# Patient Record
Sex: MALE | URBAN - METROPOLITAN AREA
[De-identification: names, ages, dates, MRNs, and addresses within clinical notes are randomized per-mention and may not be internally consistent; named-entity substitution may affect disease eponyms.]

---

## 2023-11-22 ENCOUNTER — DOCTOR'S OFFICE (OUTPATIENT)
Dept: URBAN - METROPOLITAN AREA CLINIC 162 | Facility: CLINIC | Age: 33
Setting detail: OPHTHALMOLOGY
End: 2023-11-22
Payer: COMMERCIAL

## 2023-11-22 DIAGNOSIS — H57.12: ICD-10-CM

## 2023-11-22 PROCEDURE — 92004 COMPRE OPH EXAM NEW PT 1/>: CPT | Performed by: OPHTHALMOLOGY

## 2023-11-22 ASSESSMENT — CONFRONTATIONAL VISUAL FIELD TEST (CVF)
OS_FINDINGS: FULL
OD_FINDINGS: FULL

## 2024-02-23 ENCOUNTER — HOSPITAL ENCOUNTER (EMERGENCY)
Facility: HOSPITAL | Age: 34
Discharge: HOME | End: 2024-02-23
Attending: EMERGENCY MEDICINE
Payer: COMMERCIAL

## 2024-02-23 VITALS
SYSTOLIC BLOOD PRESSURE: 166 MMHG | WEIGHT: 230 LBS | BODY MASS INDEX: 29.52 KG/M2 | DIASTOLIC BLOOD PRESSURE: 74 MMHG | OXYGEN SATURATION: 100 % | TEMPERATURE: 98.7 F | HEART RATE: 72 BPM | HEIGHT: 74 IN | RESPIRATION RATE: 17 BRPM

## 2024-02-23 DIAGNOSIS — R59.0 LYMPHADENOPATHY, AXILLARY: Primary | ICD-10-CM

## 2024-02-23 LAB
ANION GAP SERPL CALCULATED.3IONS-SCNC: 5 MMOL/L
BASOPHILS # BLD AUTO: 0.02 THOUSANDS/ÂΜL (ref 0–0.1)
BASOPHILS NFR BLD AUTO: 0 % (ref 0–1)
BILIRUB UR QL STRIP: NEGATIVE
BUN SERPL-MCNC: 22 MG/DL (ref 5–25)
CALCIUM SERPL-MCNC: 10 MG/DL (ref 8.4–10.2)
CHLORIDE SERPL-SCNC: 104 MMOL/L (ref 96–108)
CLARITY UR: CLEAR
CO2 SERPL-SCNC: 30 MMOL/L (ref 21–32)
COLOR UR: NORMAL
CREAT SERPL-MCNC: 1.19 MG/DL (ref 0.6–1.3)
EOSINOPHIL # BLD AUTO: 0.05 THOUSAND/ÂΜL (ref 0–0.61)
EOSINOPHIL NFR BLD AUTO: 1 % (ref 0–6)
ERYTHROCYTE [DISTWIDTH] IN BLOOD BY AUTOMATED COUNT: 13 % (ref 11.6–15.1)
GFR SERPL CREATININE-BSD FRML MDRD: 78 ML/MIN/1.73SQ M
GLUCOSE SERPL-MCNC: 90 MG/DL (ref 65–140)
GLUCOSE UR STRIP-MCNC: NEGATIVE MG/DL
HCT VFR BLD AUTO: 43.5 % (ref 36.5–49.3)
HGB BLD-MCNC: 14 G/DL (ref 12–17)
HGB UR QL STRIP.AUTO: NEGATIVE
IMM GRANULOCYTES # BLD AUTO: 0.06 THOUSAND/UL (ref 0–0.2)
IMM GRANULOCYTES NFR BLD AUTO: 1 % (ref 0–2)
KETONES UR STRIP-MCNC: NEGATIVE MG/DL
LEUKOCYTE ESTERASE UR QL STRIP: NEGATIVE
LYMPHOCYTES # BLD AUTO: 2.35 THOUSANDS/ÂΜL (ref 0.6–4.47)
LYMPHOCYTES NFR BLD AUTO: 43 % (ref 14–44)
MCH RBC QN AUTO: 28.2 PG (ref 26.8–34.3)
MCHC RBC AUTO-ENTMCNC: 32.2 G/DL (ref 31.4–37.4)
MCV RBC AUTO: 88 FL (ref 82–98)
MONOCYTES # BLD AUTO: 0.34 THOUSAND/ÂΜL (ref 0.17–1.22)
MONOCYTES NFR BLD AUTO: 6 % (ref 4–12)
NEUTROPHILS # BLD AUTO: 2.63 THOUSANDS/ÂΜL (ref 1.85–7.62)
NEUTS SEG NFR BLD AUTO: 49 % (ref 43–75)
NITRITE UR QL STRIP: NEGATIVE
NRBC BLD AUTO-RTO: 0 /100 WBCS
PH UR STRIP.AUTO: 6 [PH]
PLATELET # BLD AUTO: 307 THOUSANDS/UL (ref 149–390)
PMV BLD AUTO: 8.5 FL (ref 8.9–12.7)
POTASSIUM SERPL-SCNC: 4.8 MMOL/L (ref 3.5–5.3)
PROT UR STRIP-MCNC: NEGATIVE MG/DL
RBC # BLD AUTO: 4.97 MILLION/UL (ref 3.88–5.62)
SODIUM SERPL-SCNC: 139 MMOL/L (ref 135–147)
SP GR UR STRIP.AUTO: 1.03 (ref 1–1.03)
UROBILINOGEN UR STRIP-ACNC: <2 MG/DL
WBC # BLD AUTO: 5.45 THOUSAND/UL (ref 4.31–10.16)

## 2024-02-23 PROCEDURE — 81003 URINALYSIS AUTO W/O SCOPE: CPT

## 2024-02-23 PROCEDURE — 36415 COLL VENOUS BLD VENIPUNCTURE: CPT

## 2024-02-23 PROCEDURE — 99284 EMERGENCY DEPT VISIT MOD MDM: CPT

## 2024-02-23 PROCEDURE — 85025 COMPLETE CBC W/AUTO DIFF WBC: CPT

## 2024-02-23 PROCEDURE — 80048 BASIC METABOLIC PNL TOTAL CA: CPT

## 2024-02-23 PROCEDURE — 99283 EMERGENCY DEPT VISIT LOW MDM: CPT

## 2024-02-23 NOTE — Clinical Note
Tai Chance was seen and treated in our emergency department on 2/23/2024.                Diagnosis: Lymphadenopathy    Tai  may return to work on return date.    He may return on this date: 02/26/2024         If you have any questions or concerns, please don't hesitate to call.      Remington Velazquez PA-C    ______________________________           _______________          _______________  Hospital Representative                              Date                                Time

## 2024-02-23 NOTE — ED PROVIDER NOTES
"History  Chief Complaint   Patient presents with    Swollen Glands     Pt reports being seen at urgent care 3 weeks ago, was told he had swollen lymph nodes under arm and they should subside. Reports they are still there and are now \"bothersome\" Does not have a PCP currently.      Patient is a 33-year-old male presented ED with chief complaint of lymphadenopathy in the axillary region.  Patient states that he was seen at urgent care 2 weeks ago for same issue and they stated that if it does not go away in 2 weeks to have a follow-up appointment.  Patient presents today because he does not have a PCP and the swelling to the has not gone away.  Patient does state that the swelling has decreased since the initial presentation at the urgent care.  Denies any pain but he states that there is some discomfort in his axillary region.  He also states that he has some discomfort in his left side of his body.  Patient states that he has some tenderness over his ribs on the left side.  Patient was found to be flu positive when the lymphadenopathy first arose.  Denies any masses over chest neck.  Denies cough, congestion, vomiting, diarrhea, chest pain, shortness of breath, loss function, loss sensation, ambulation issues, gait dysfunction, fevers, weight loss, diaphoresis, easy bruising, other adenopathy, urinary bowel changes, hemoptysis, meta emesis, hematochezia.        None       History reviewed. No pertinent past medical history.    History reviewed. No pertinent surgical history.    History reviewed. No pertinent family history.  I have reviewed and agree with the history as documented.    E-Cigarette/Vaping     E-Cigarette/Vaping Substances     Social History     Tobacco Use    Smoking status: Never    Smokeless tobacco: Never   Substance Use Topics    Alcohol use: Not Currently    Drug use: Never       Review of Systems   Constitutional:  Negative for chills, diaphoresis, fatigue and fever.   HENT:  Negative for " congestion, ear pain, postnasal drip, rhinorrhea, sinus pressure, sinus pain and sore throat.    Eyes:  Negative for pain and discharge.   Respiratory:  Negative for cough and shortness of breath.    Cardiovascular:  Negative for chest pain and palpitations.   Gastrointestinal:  Negative for abdominal pain, blood in stool, constipation, diarrhea, nausea and vomiting.   Genitourinary:  Negative for difficulty urinating, dysuria, flank pain, hematuria, penile pain and testicular pain.   Musculoskeletal:  Negative for arthralgias, back pain, myalgias and neck pain.   Skin:  Negative for color change, rash and wound.   Neurological:  Negative for dizziness, syncope, light-headedness, numbness and headaches.   Hematological:  Positive for adenopathy.       Physical Exam  Physical Exam  Vitals and nursing note reviewed.   Constitutional:       General: He is not in acute distress.     Appearance: Normal appearance. He is normal weight. He is not ill-appearing, toxic-appearing or diaphoretic.   HENT:      Head: Normocephalic.      Right Ear: External ear normal.      Left Ear: External ear normal.      Nose: Nose normal.      Mouth/Throat:      Mouth: Mucous membranes are moist.   Eyes:      Conjunctiva/sclera: Conjunctivae normal.   Cardiovascular:      Rate and Rhythm: Normal rate and regular rhythm.      Pulses: Normal pulses.      Heart sounds: Normal heart sounds.   Pulmonary:      Effort: Pulmonary effort is normal. No respiratory distress.      Breath sounds: Normal breath sounds. No stridor. No wheezing, rhonchi or rales.   Chest:      Chest wall: No tenderness.   Abdominal:      General: Bowel sounds are normal. There is no distension.      Palpations: Abdomen is soft. There is no mass.      Tenderness: There is no abdominal tenderness. There is no right CVA tenderness, left CVA tenderness or guarding.      Hernia: No hernia is present.   Musculoskeletal:         General: No tenderness. Normal range of motion.       Cervical back: Normal range of motion. No rigidity or tenderness.   Lymphadenopathy:      Cervical: No cervical adenopathy.   Skin:     General: Skin is warm and dry.      Capillary Refill: Capillary refill takes less than 2 seconds.      Findings: No rash.   Neurological:      General: No focal deficit present.      Mental Status: He is alert and oriented to person, place, and time.      Comments: 1 cm area of adenopathy in axillary region.  Firm to palpation nontender.  The 1 cm lesion is movable.   Psychiatric:         Mood and Affect: Mood normal.         Vital Signs  ED Triage Vitals [02/23/24 0814]   Temperature Pulse Respirations Blood Pressure SpO2   98.7 °F (37.1 °C) 72 17 166/74 100 %      Temp Source Heart Rate Source Patient Position - Orthostatic VS BP Location FiO2 (%)   Tympanic Monitor Sitting Right arm --      Pain Score       --           Vitals:    02/23/24 0814   BP: 166/74   Pulse: 72   Patient Position - Orthostatic VS: Sitting         Visual Acuity      ED Medications  Medications - No data to display    Diagnostic Studies  Results Reviewed       Procedure Component Value Units Date/Time    Basic metabolic panel [[id number]] Collected: 02/23/24 0910    Lab Status: Final result Specimen: Blood from Arm, Right Updated: 02/23/24 0953     Sodium 139 mmol/L      Potassium 4.8 mmol/L      Chloride 104 mmol/L      CO2 30 mmol/L      ANION GAP 5 mmol/L      BUN 22 mg/dL      Creatinine 1.19 mg/dL      Glucose 90 mg/dL      Calcium 10.0 mg/dL      eGFR 78 ml/min/1.73sq m     Narrative:      National Kid[employer] Foundation guidelines for Chronic Kidney Disease (CKD):     Stage 1 with normal or high GFR (GFR > 90 mL/min/1.73 square meters)    Stage 2 Mild CKD (GFR = 60-89 mL/min/1.73 square meters)    Stage 3A Moderate CKD (GFR = 45-59 mL/min/1.73 square meters)    Stage 3B Moderate CKD (GFR = 30-44 mL/min/1.73 square meters)    Stage 4 Severe CKD (GFR = 15-29 mL/min/1.73 square meters)    Stage 5 End  Stage CKD (GFR <15 mL/min/1.73 square meters)  Note: GFR calculation is accurate only with a steady state creatinine    UA w Reflex to Microscopic w Reflex to Culture [[id number]] Collected: 02/23/24 0909    Lab Status: Final result Specimen: Urine, Clean Catch Updated: 02/23/24 0920     Color, UA Light Yellow     Clarity, UA Clear     Specific Gravity, UA 1.028     pH, UA 6.0     Leukocytes, UA Negative     Nitrite, UA Negative     Protein, UA Negative mg/dl      Glucose, UA Negative mg/dl      Ketones, UA Negative mg/dl      Urobilinogen, UA <2.0 mg/dl      Bilirubin, UA Negative     Occult Blood, UA Negative    CBC and differential [[id number]]  (Abnormal) Collected: 02/23/24 0910    Lab Status: Final result Specimen: Blood from Arm, Right Updated: 02/23/24 0918     WBC 5.45 Thousand/uL      RBC 4.97 Million/uL      Hemoglobin 14.0 g/dL      Hematocrit 43.5 %      MCV 88 fL      MCH 28.2 pg      MCHC 32.2 g/dL      RDW 13.0 %      MPV 8.5 fL      Platelets 307 Thousands/uL      nRBC 0 /100 WBCs      Neutrophils Relative 49 %      Immat GRANS % 1 %      Lymphocytes Relative 43 %      Monocytes Relative 6 %      Eosinophils Relative 1 %      Basophils Relative 0 %      Neutrophils Absolute 2.63 Thousands/µL      Immature Grans Absolute 0.06 Thousand/uL      Lymphocytes Absolute 2.35 Thousands/µL      Monocytes Absolute 0.34 Thousand/µL      Eosinophils Absolute 0.05 Thousand/µL      Basophils Absolute 0.02 Thousands/µL                    No orders to display              Procedures  POC MSK/Soft Tissue US    Date/Time: 2/23/2024 10:24 AM    Performed by: Remington Velazquez PA-C  Authorized by: Remington Velazquez PA-C    Patient location:  ED  Performed by:  NP/PA  Other Assisting Provider: No    Procedure:     Performed: soft tissue ultrasound    Procedure details:     Exam Type:  Diagnostic    Transverse view:  Obtained    Image quality: limited diagnostic      Image availability:  Still images obtained  Soft tissue  ultrasound:     Soft tissue indications: swelling and suspected abscess      Anatomic location:  Axilla    Soft tissue findings: subcutaneous collection (comment)    Interpretation:     Soft tissue impressions: indeterminate             ED Course                                             Medical Decision Making  Patient is 33 male presenting ED with chief complaint of lymphadenopathy in the axilla region.  Patient stated that he was seen in urgent care 2 weeks ago who stated that if it is not better in 2 weeks to be seen again.  Exam shows benign cardiopulmonary exam.  Patient also has benign abdominal exam.  Patient stated that he had a little bit of flank tenderness.  Urinalysis shows no abnormalities.  1 cm firm abscess in the axilla region lysed under ultrasound with picture in media.  Lesion is nontender to palpation.  Patient also states that the lesion is shrinking over the past 2 weeks.  I explained that he should continue to monitor for the next 4 weeks and then follow-up with PCP if it does not resolve or gets bigger or becomes painful.  Patient was for diagnosis treatment plan had no further questions.  Patient was discharged in stable condition with no further questions.  Baseline labs showed normal abnormalities.  Patient told to return to the ED with any worsening symptoms, chest pain, shortness of breath, loss of sensation, loss of function, fatigue, weakness, weight loss, decreased oral intake.    Ddx-axilla lymphadenopathy, sebaceous cyst, cyst, folliculitis, viral illness, viral syndrome    Amount and/or Complexity of Data Reviewed  Labs: ordered.  Radiology: ordered.     Details: Ultrasound in media.             Disposition  Final diagnoses:   Lymphadenopathy, axillary     Time reflects when diagnosis was documented in both MDM as applicable and the Disposition within this note       Time User Action Codes Description Comment    2/23/2024  9:58 AM Remington Velazquez Add [R59.0] Lymphadenopathy,  axillary           ED Disposition       ED Disposition   Discharge    Condition   Stable    Date/Time   Fri Feb 23, 2024  9:57 AM    Comment   Tai Chance discharge to home/self care.                   Follow-up Information       Follow up With Specialties Details Why Contact Info Additional Information    [facility] Emergency Medicine  If symptoms worsen [address] [facility] [address]  [phone] [facility], [address] [place], [place], [address]    [facility] In 1 week  [address]  [facility] [id number] [phone] [facility], [address], [place], [place], [address] [phone]            There are no discharge medications for this patient.          PDMP Review       None            ED Provider  Electronically Signed by             Remington Velazquez PA-C  02/23/24 3809

## 2024-02-23 NOTE — DISCHARGE INSTRUCTIONS
Patient advised to follow-up with PCP for today's ED visit.  Patient advised to continue to monitor lymphadenopathy over the next 3 weeks.  Patient advised to return to the ED with any worsening symptoms, chest pain, shortness of breath, fevers, weakness, excessive weight loss, loss of function, loss sensation.

## 2024-05-09 ENCOUNTER — OFFICE VISIT (OUTPATIENT)
Dept: URGENT CARE | Facility: CLINIC | Age: 34
End: 2024-05-09
Payer: COMMERCIAL

## 2024-05-09 VITALS
TEMPERATURE: 98.6 F | HEART RATE: 108 BPM | DIASTOLIC BLOOD PRESSURE: 77 MMHG | SYSTOLIC BLOOD PRESSURE: 148 MMHG | OXYGEN SATURATION: 97 % | RESPIRATION RATE: 18 BRPM

## 2024-05-09 DIAGNOSIS — J02.9 ACUTE PHARYNGITIS, UNSPECIFIED ETIOLOGY: Primary | ICD-10-CM

## 2024-05-09 LAB — S PYO AG THROAT QL: NEGATIVE

## 2024-05-09 PROCEDURE — 87147 CULTURE TYPE IMMUNOLOGIC: CPT | Performed by: PHYSICIAN ASSISTANT

## 2024-05-09 PROCEDURE — G0382 LEV 3 HOSP TYPE B ED VISIT: HCPCS | Performed by: PHYSICIAN ASSISTANT

## 2024-05-09 PROCEDURE — S9083 URGENT CARE CENTER GLOBAL: HCPCS | Performed by: PHYSICIAN ASSISTANT

## 2024-05-09 PROCEDURE — 87070 CULTURE OTHR SPECIMN AEROBIC: CPT | Performed by: PHYSICIAN ASSISTANT

## 2024-05-09 NOTE — PROGRESS NOTES
[facility]        NAME: Tai Chance is a 33 y.o. male  : 1990    MRN: [id number]  DATE: May 9, 2024  TIME: 8:28 AM    Assessment and Plan   Acute pharyngitis, unspecified etiology [J02.9]  1. Acute pharyngitis, unspecified etiology  POCT rapid ANTIGEN strepA    Throat culture        Likely viral.  Educated pt on symptomatic management.  Will send for culture.  Pt understands and agrees to plan    Patient Instructions       Follow up with PCP in 3-5 days.  Proceed to  ER if symptoms worsen.    If tests are performed, our office will contact you with results only if changes need to made to the care plan discussed with you at the visit. [facility]    Chief Complaint     Chief Complaint   Patient presents with    Sore Throat     Sore throat, left ear pain, cough, congestion since last night. Had fever yesterday.         History of Present Illness       Sore Throat   This is a new problem. The current episode started yesterday. The problem has been gradually worsening. Neither side of throat is experiencing more pain than the other. There has been no fever. The pain is moderate. Associated symptoms include coughing, a hoarse voice and swollen glands. Pertinent negatives include no abdominal pain, congestion, diarrhea, ear discharge, ear pain, headaches, shortness of breath, stridor, trouble swallowing or vomiting. He has had no exposure to strep or mono. He has tried nothing for the symptoms. The treatment provided no relief.       Review of Systems   Review of Systems   Constitutional:  Negative for chills, diaphoresis, fatigue and fever.   HENT:  Positive for hoarse voice, postnasal drip, sinus pressure, sinus pain and sore throat. Negative for congestion, ear discharge, ear pain, rhinorrhea, sneezing and trouble swallowing.    Eyes: Negative.    Respiratory:  Positive for cough. Negative for chest tightness, shortness of breath, wheezing and stridor.     Cardiovascular: Negative.  Negative for chest pain and palpitations.   Gastrointestinal:  Negative for abdominal distention, abdominal pain, diarrhea, nausea and vomiting.   Endocrine: Negative.    Genitourinary:  Negative for dysuria.   Musculoskeletal: Negative.    Skin:  Negative for rash.   Allergic/Immunologic: Negative.    Neurological: Negative.  Negative for light-headedness and headaches.   Hematological: Negative.    Psychiatric/Behavioral: Negative.           Current Medications     No current outpatient medications on file.    Current Allergies     Allergies as of 05/09/2024    (No Known Allergies)            The following portions of the patient's history were reviewed and updated as appropriate: allergies, current medications, past family history, past medical history, past social history, past surgical history and problem list.     History reviewed. No pertinent past medical history.    History reviewed. No pertinent surgical history.    History reviewed. No pertinent family history.      Medications have been verified.        Objective   /77   Pulse (!) 108   Temp 98.6 °F (37 °C)   Resp 18   SpO2 97%        Physical Exam     Physical Exam  Vitals and nursing note reviewed.   Constitutional:       General: He is not in acute distress.     Appearance: Normal appearance. He is well-developed. He is not ill-appearing or diaphoretic.   HENT:      Head: Normocephalic and atraumatic.      Right Ear: Tympanic membrane, ear canal and external ear normal.      Left Ear: Tympanic membrane, ear canal and external ear normal.      Nose: Congestion present. No rhinorrhea.      Mouth/Throat:      Pharynx: Posterior oropharyngeal erythema present. No oropharyngeal exudate.   Eyes:      General:         Right eye: No discharge.         Left eye: No discharge.      Conjunctiva/sclera: Conjunctivae normal.      Pupils: Pupils are equal, round, and reactive to light.   Cardiovascular:      Rate and Rhythm:  Normal rate and regular rhythm.      Heart sounds: Normal heart sounds.   Pulmonary:      Effort: Pulmonary effort is normal. No respiratory distress.      Breath sounds: Normal breath sounds. No wheezing, rhonchi or rales.   Musculoskeletal:      Cervical back: Normal range of motion and neck supple.   Lymphadenopathy:      Cervical: No cervical adenopathy.   Skin:     General: Skin is warm.      Capillary Refill: Capillary refill takes less than 2 seconds.      Coloration: Skin is not pale.      Findings: No rash.   Neurological:      Mental Status: He is alert.

## 2024-05-09 NOTE — LETTER
May 9, 2024     Patient: Tai Chance   YOB: 1990   Date of Visit: 5/9/2024       To Whom It May Concern:    It is my medical opinion that Tai Chance may return to work on 5/11/2024 .    If you have any questions or concerns, please don't hesitate to call.         Sincerely,        Phuc Sinha PA-C    CC: No Recipients

## 2024-05-09 NOTE — PATIENT INSTRUCTIONS
Continue to monitor symptoms.  Drink plenty of fluids.  Use over the counter Tylenol or Ibuprofen for fever and pain relief.  If new or worsening symptoms develop, go immediately to the Er.  Follow up with family doctor this week.    If tests are performed, our office will contact you with results only if changes need to made to the care plan discussed with you at the visit. You can review your full results on [facility] Mychart.     Pharyngitis   WHAT YOU NEED TO KNOW:   Pharyngitis, or sore throat, is inflammation of the tissues and structures in your pharynx (throat). Pharyngitis is most often caused by bacteria or a virus. Other causes include smoking, allergies, or acid reflux.  DISCHARGE INSTRUCTIONS:   Call your local emergency number (911 in the [place]) if:   You have trouble breathing or swallowing because your throat is swollen.      Return to the emergency department if:   You are drooling because it hurts too much to swallow.    Your fever is higher than 102?F (39?C) or lasts longer than 3 days.    You are confused.    You taste blood.    Call your doctor if:   Your throat pain gets worse.    You have a painful lump in your throat that does not go away after 5 days.    Your symptoms do not improve after 5 days.    You have questions or concerns about your condition or care.    Medicines:  Viral pharyngitis will go away on its own without treatment. Your sore throat should start to feel better in 3 to 5 days. You may need any of the following:  Antibiotics  treat a bacterial infection.    NSAIDs  help decrease swelling and pain or fever. This medicine is available with or without a doctor's order. NSAIDs can cause stomach bleeding or kidney problems in certain people. If you take blood thinner medicine, always ask your healthcare provider if NSAIDs are safe for you. Always read the medicine label and follow directions.    Acetaminophen  decreases pain and fever. It is available without a doctor's order. Ask  how much to take and how often to take it. Follow directions. Read the labels of all other medicines you are using to see if they also contain acetaminophen, or ask your doctor or pharmacist. Acetaminophen can cause liver damage if not taken correctly.    Take your medicine as directed.  Contact your healthcare provider if you think your medicine is not helping or if you have side effects. Tell your provider if you are allergic to any medicine. Keep a list of the medicines, vitamins, and herbs you take. Include the amounts, and when and why you take them. Bring the list or the pill bottles to follow-up visits. Carry your medicine list with you in case of an emergency.    Manage your symptoms:   Gargle salt water.  Mix ¼ teaspoon salt in an 8 ounce glass of warm water and gargle. Do not swallow. Salt water may help decrease swelling in your throat.    Drink liquids as directed.  You may need to drink more liquids than usual. Liquids may help soothe your throat and prevent dehydration. Ask how much liquid to drink each day and which liquids are best for you.    Use a cool mist humidifier.  This will add moisture to the air and help decrease your cough.    Soothe your throat.  Cough drops, ice, soft foods, or popsicles may help decrease throat pain.    Prevent the spread of pharyngitis:  Cover your mouth and nose when you cough or sneeze. Do not share food or drinks. Wash your hands often. Use soap and water. If soap and water are unavailable, use an alcohol-based hand .       Follow up with your doctor as directed:  Write down your questions so you remember to ask them during your visits.  © Copyright [employer] 2023 Information is for End User's use only and may not be sold, redistributed or otherwise used for commercial purposes.  The above information is an  only. It is not intended as medical advice for individual conditions or treatments. Talk to your doctor, nurse or pharmacist before  following any medical regimen to see if it is safe and effective for you.

## 2024-05-11 LAB — BACTERIA THROAT CULT: ABNORMAL

## 2024-09-17 ENCOUNTER — HOSPITAL ENCOUNTER (OUTPATIENT)
Dept: CT IMAGING | Facility: HOSPITAL | Age: 34
Discharge: HOME | End: 2024-09-17
Payer: COMMERCIAL

## 2024-09-17 ENCOUNTER — OFFICE VISIT (OUTPATIENT)
Dept: FAMILY MEDICINE CLINIC | Facility: CLINIC | Age: 34
End: 2024-09-17
Payer: COMMERCIAL

## 2024-09-17 VITALS
SYSTOLIC BLOOD PRESSURE: 122 MMHG | OXYGEN SATURATION: 98 % | DIASTOLIC BLOOD PRESSURE: 68 MMHG | HEART RATE: 99 BPM | WEIGHT: 208.9 LBS | BODY MASS INDEX: 26.81 KG/M2 | TEMPERATURE: 98.5 F | HEIGHT: 74 IN

## 2024-09-17 DIAGNOSIS — F41.0 ANXIETY ATTACK: ICD-10-CM

## 2024-09-17 DIAGNOSIS — R00.0 TACHYCARDIA: ICD-10-CM

## 2024-09-17 DIAGNOSIS — Z13.220 LIPID SCREENING: ICD-10-CM

## 2024-09-17 DIAGNOSIS — R07.9 CHEST PAIN, UNSPECIFIED TYPE: Primary | ICD-10-CM

## 2024-09-17 DIAGNOSIS — R07.9 CHEST PAIN, UNSPECIFIED TYPE: ICD-10-CM

## 2024-09-17 PROCEDURE — 93000 ELECTROCARDIOGRAM COMPLETE: CPT

## 2024-09-17 PROCEDURE — 71275 CT ANGIOGRAPHY CHEST: CPT

## 2024-09-17 PROCEDURE — 99204 OFFICE O/P NEW MOD 45 MIN: CPT

## 2024-09-17 RX ORDER — LORAZEPAM 0.5 MG/1
0.5 TABLET ORAL EVERY 8 HOURS PRN
Qty: 30 TABLET | Refills: 0 | Status: SHIPPED | OUTPATIENT
Start: 2024-09-17

## 2024-09-17 RX ADMIN — IOHEXOL 85 ML: 350 INJECTION, SOLUTION INTRAVENOUS at 16:39

## 2024-09-17 NOTE — PROGRESS NOTES
Ambulatory Visit  Name: Tai Chance      : 1990      MRN: [id number]  Encounter Provider: Rosey Mckeon PA-C  Encounter Date: 2024   Encounter department: [facility]    Assessment & Plan  Chest pain, unspecified type  - patient presents for evaluation of intermittent chest pain with radiation down left upper extremity x 2-3 weeks  - physical exam today unremarkable with normal BP, clear lungs with O2 98% on room air -- slightly tachycardic with HR 99 bpm   - EKG completed today showed NSR , rightward axis with no acute findings of concern   - reviewed stress test and echo from 2024 which were overall unremarkable which is reassuring   - given chest pain and tachycardia - ordered CTA stat to rule out PE along with stat stress test which patient is going to call and schedule  - ordered CBC, CMP, and TSH to rule out underlying cause   - patient has nurse visit tomorrow to place Holter monitor for further evaluation (can't get placed today as he is going for CTA)   - did discuss possibility that this is related to anxiety/panic attacks given symptoms started around same time he started experiencing increased life stressors -- pt agreeable and thinks anxiety is playing a role  - therefore we will trial ativan 0.5 mg PRN for anxiety attacks, pt is going to try taking medication next time he experiences symptoms to see if it helps; did fill out controlled substance agreement today and discuss side effects  - no current chest pain at this time -- did recommend if he experience any new or worsening chest pain, shortness of breath, headache, dizziness, lightheadedness to go to ER right away to rule out acute cardiac issue which he was agreeable to  - follow up in office in two weeks to reassess and for medication check, to call sooner with any questions or concerns    Orders:    POCT ECG    CTA chest pe study; Future    Stress test only, exercise; Future    CBC and differential;  Future    Comprehensive metabolic panel; Future    Tachycardia    Orders:    CTA chest pe study; Future    Stress test only, exercise; Future    CBC and differential; Future    Comprehensive metabolic panel; Future    TSH, 3rd generation with Free T4 reflex; Future    Lipid screening    Orders:    Lipid panel; Future    Anxiety attack    Orders:    LORazepam (Ativan) 0.5 mg tablet; Take 1 tablet (0.5 mg total) by mouth every 8 (eight) hours as needed for anxiety       History of Present Illness       CC: establish care, chest pain     Patient presents to establish care. He also would like to discuss intermittent episodes of intense chest pain that radiates down his left arm that has been happening for the last 2-3 weeks. Patient reports he has been under an extreme amount of stress the last three weeks and shortly after he started experiencing these episodes. Reports the chest pain lasts about 2 minutes, and then he sits down and takes a deep breath and it resolves shortly after. He does not have any associated shortness of breath. He does admit he think it may be related to anxiety.   Patient had palpitations in the beginning of this year and had a full cardiac work up in 2/2024 including a stress test, echo, and holter monitor which was overall unremarkable and he was cleared by cardiology. Patient reports this feels different then what he experienced then.   Patient also reports it feels like his heart is racing and he is worried he may have a blood clot.     He denies any headaches, lightheadedness, dizziness. However reports sometimes when he has the chest pain it does radiate to the left side of his upper shoulder/neck but goes away when the chest pain resolves.     He does not use tobacco and denies any illicit drug use. He has not started any new supplements or medications. He is not on any daily medication. Only thing that has changed or seems to trigger symptoms is increased stress.         History  "obtained from : patient  Review of Systems   Constitutional:  Negative for chills, diaphoresis and fever.   Respiratory:  Positive for chest tightness. Negative for cough, shortness of breath and wheezing.    Cardiovascular:  Positive for chest pain (intermittent). Negative for palpitations.   Gastrointestinal:  Negative for abdominal pain.   Musculoskeletal:  Positive for arthralgias. Negative for neck stiffness.   Neurological:  Negative for dizziness, tremors, syncope, speech difficulty, weakness, light-headedness, numbness and headaches.     Medical History Reviewed by provider this encounter:  Tobacco  Allergies  Meds  Problems  Med Hx  Surg Hx  Fam Hx       Past Medical History   History reviewed. No pertinent past medical history.  History reviewed. No pertinent surgical history.  History reviewed. No pertinent family history.  No current outpatient medications on file prior to visit.     No current facility-administered medications on file prior to visit.   No Known Allergies   No current outpatient medications on file prior to visit.     No current facility-administered medications on file prior to visit.      Social History     Tobacco Use    Smoking status: Never    Smokeless tobacco: Never   Vaping Use    Vaping status: Never Used   Substance and Sexual Activity    Alcohol use: Not Currently    Drug use: Never    Sexual activity: Not on file     Objective     /68   Pulse 99   Temp 98.5 °F (36.9 °C)   Ht 6' 2\" (1.88 m)   Wt 94.8 kg (208 lb 14.4 oz)   SpO2 98%   BMI 26.82 kg/m²     Physical Exam  Vitals reviewed.   Constitutional:       General: He is not in acute distress.     Appearance: Normal appearance. He is not ill-appearing or diaphoretic.   HENT:      Head: Normocephalic and atraumatic.      Right Ear: External ear normal.      Left Ear: External ear normal.      Nose: Nose normal.      Mouth/Throat:      Mouth: Mucous membranes are moist.   Eyes:      General:         Right " eye: No discharge.         Left eye: No discharge.      Conjunctiva/sclera: Conjunctivae normal.   Cardiovascular:      Rate and Rhythm: Normal rate and regular rhythm.      Pulses: Normal pulses.      Heart sounds: Normal heart sounds. No murmur heard.  Pulmonary:      Effort: Pulmonary effort is normal. No respiratory distress.      Breath sounds: Normal breath sounds. No wheezing, rhonchi or rales.   Musculoskeletal:         General: Normal range of motion.      Cervical back: Normal range of motion.      Right lower leg: No edema.      Left lower leg: No edema.   Skin:     General: Skin is warm.   Neurological:      General: No focal deficit present.      Mental Status: He is alert.      Cranial Nerves: No cranial nerve deficit, dysarthria or facial asymmetry.      Motor: No weakness or tremor.      Gait: Gait normal.   Psychiatric:         Mood and Affect: Mood normal.

## 2024-09-18 ENCOUNTER — APPOINTMENT (OUTPATIENT)
Dept: LAB | Facility: CLINIC | Age: 34
End: 2024-09-18
Payer: COMMERCIAL

## 2024-09-18 DIAGNOSIS — R07.9 CHEST PAIN, UNSPECIFIED TYPE: ICD-10-CM

## 2024-09-18 DIAGNOSIS — Z13.220 LIPID SCREENING: ICD-10-CM

## 2024-09-18 DIAGNOSIS — R00.0 TACHYCARDIA: ICD-10-CM

## 2024-09-18 LAB
ALBUMIN SERPL BCG-MCNC: 4.5 G/DL (ref 3.5–5)
ALP SERPL-CCNC: 56 U/L (ref 34–104)
ALT SERPL W P-5'-P-CCNC: 18 U/L (ref 7–52)
ANION GAP SERPL CALCULATED.3IONS-SCNC: 9 MMOL/L (ref 4–13)
AST SERPL W P-5'-P-CCNC: 15 U/L (ref 13–39)
BASOPHILS # BLD AUTO: 0.03 THOUSANDS/ΜL (ref 0–0.1)
BASOPHILS NFR BLD AUTO: 1 % (ref 0–1)
BILIRUB SERPL-MCNC: 0.33 MG/DL (ref 0.2–1)
BUN SERPL-MCNC: 17 MG/DL (ref 5–25)
CALCIUM SERPL-MCNC: 9.8 MG/DL (ref 8.4–10.2)
CHLORIDE SERPL-SCNC: 104 MMOL/L (ref 96–108)
CHOLEST SERPL-MCNC: 260 MG/DL
CO2 SERPL-SCNC: 27 MMOL/L (ref 21–32)
CREAT SERPL-MCNC: 1.14 MG/DL (ref 0.6–1.3)
EOSINOPHIL # BLD AUTO: 0.07 THOUSAND/ΜL (ref 0–0.61)
EOSINOPHIL NFR BLD AUTO: 1 % (ref 0–6)
ERYTHROCYTE [DISTWIDTH] IN BLOOD BY AUTOMATED COUNT: 12.3 % (ref 11.6–15.1)
GFR SERPL CREATININE-BSD FRML MDRD: 82 ML/MIN/1.73SQ M
GLUCOSE P FAST SERPL-MCNC: 88 MG/DL (ref 65–99)
HCT VFR BLD AUTO: 45.8 % (ref 36.5–49.3)
HDLC SERPL-MCNC: 46 MG/DL
HGB BLD-MCNC: 14.7 G/DL (ref 12–17)
IMM GRANULOCYTES # BLD AUTO: 0.05 THOUSAND/UL (ref 0–0.2)
IMM GRANULOCYTES NFR BLD AUTO: 1 % (ref 0–2)
LDLC SERPL CALC-MCNC: 188 MG/DL (ref 0–100)
LYMPHOCYTES # BLD AUTO: 2.5 THOUSANDS/ΜL (ref 0.6–4.47)
LYMPHOCYTES NFR BLD AUTO: 44 % (ref 14–44)
MCH RBC QN AUTO: 28.3 PG (ref 26.8–34.3)
MCHC RBC AUTO-ENTMCNC: 32.1 G/DL (ref 31.4–37.4)
MCV RBC AUTO: 88 FL (ref 82–98)
MONOCYTES # BLD AUTO: 0.34 THOUSAND/ΜL (ref 0.17–1.22)
MONOCYTES NFR BLD AUTO: 6 % (ref 4–12)
NEUTROPHILS # BLD AUTO: 2.75 THOUSANDS/ΜL (ref 1.85–7.62)
NEUTS SEG NFR BLD AUTO: 47 % (ref 43–75)
NONHDLC SERPL-MCNC: 214 MG/DL
NRBC BLD AUTO-RTO: 0 /100 WBCS
PLATELET # BLD AUTO: 325 THOUSANDS/UL (ref 149–390)
PMV BLD AUTO: 8.9 FL (ref 8.9–12.7)
POTASSIUM SERPL-SCNC: 4.1 MMOL/L (ref 3.5–5.3)
PROT SERPL-MCNC: 7.6 G/DL (ref 6.4–8.4)
RBC # BLD AUTO: 5.19 MILLION/UL (ref 3.88–5.62)
SODIUM SERPL-SCNC: 140 MMOL/L (ref 135–147)
TRIGL SERPL-MCNC: 131 MG/DL
TSH SERPL DL<=0.05 MIU/L-ACNC: 1.98 UIU/ML (ref 0.45–4.5)
WBC # BLD AUTO: 5.74 THOUSAND/UL (ref 4.31–10.16)

## 2024-09-18 PROCEDURE — 84443 ASSAY THYROID STIM HORMONE: CPT

## 2024-09-18 PROCEDURE — 85025 COMPLETE CBC W/AUTO DIFF WBC: CPT

## 2024-09-18 PROCEDURE — 36415 COLL VENOUS BLD VENIPUNCTURE: CPT

## 2024-09-18 PROCEDURE — 80053 COMPREHEN METABOLIC PANEL: CPT

## 2024-09-18 PROCEDURE — 80061 LIPID PANEL: CPT

## 2024-09-19 ENCOUNTER — TELEPHONE (OUTPATIENT)
Dept: FAMILY MEDICINE CLINIC | Facility: CLINIC | Age: 34
End: 2024-09-19

## 2024-09-19 NOTE — TELEPHONE ENCOUNTER
----- Message from Rosey Mckeon PA-C sent at 9/19/2024  7:31 AM EDT -----  Total cholesterol and LDL bad cholesterol elevated; recommend low fat/low sugar diet, increase fruits and vegetables and increase daily exercise.     TSH normal     RBC and WBC normal     Kidney and liver function normal

## 2024-09-23 ENCOUNTER — TELEPHONE (OUTPATIENT)
Age: 34
End: 2024-09-23

## 2024-09-23 DIAGNOSIS — R07.9 CHEST PAIN, UNSPECIFIED TYPE: Primary | ICD-10-CM

## 2024-09-23 DIAGNOSIS — R00.0 TACHYCARDIA: ICD-10-CM

## 2024-09-23 NOTE — TELEPHONE ENCOUNTER
Pt went to drop off his Holter monitor was given a order to have stat stress test done.unable to do the test today pt already had caffeine.central scheduling said to pt call pcp to have priority change from stat to routine. pt can have it done tomorrow or if Pepper feels that pt need to have test done today does he need to go to er to have it done.please advise [phone]

## 2024-09-24 ENCOUNTER — TELEPHONE (OUTPATIENT)
Dept: FAMILY MEDICINE CLINIC | Facility: CLINIC | Age: 34
End: 2024-09-24

## 2024-09-24 NOTE — TELEPHONE ENCOUNTER
Holter showed primarily normal sinus rhythm with average HR 84 bpm. Did have high burden of tachycardia but I suspect it is related to his anxiety. No arrhythmias noted.   Will get stress test this Friday for further evaluation.

## 2024-09-27 ENCOUNTER — HOSPITAL ENCOUNTER (OUTPATIENT)
Dept: NON INVASIVE DIAGNOSTICS | Facility: HOSPITAL | Age: 34
Discharge: HOME | End: 2024-09-27
Payer: COMMERCIAL

## 2024-09-27 VITALS — OXYGEN SATURATION: 99 % | SYSTOLIC BLOOD PRESSURE: 128 MMHG | DIASTOLIC BLOOD PRESSURE: 76 MMHG | HEART RATE: 73 BPM

## 2024-09-27 DIAGNOSIS — R07.9 CHEST PAIN, UNSPECIFIED TYPE: ICD-10-CM

## 2024-09-27 DIAGNOSIS — R00.0 TACHYCARDIA: ICD-10-CM

## 2024-09-27 LAB
CHEST PAIN STATEMENT: NORMAL
MAX DIASTOLIC BP: 68 MMHG
MAX HR PERCENT: 78 %
MAX HR: 144 BPM
MAX PREDICTED HEART RATE: 184 BPM
PROTOCOL NAME: NORMAL
RATE PRESSURE PRODUCT: NORMAL
REASON FOR TERMINATION: NORMAL
SL CV STRESS RECOVERY BP: NORMAL MMHG
SL CV STRESS RECOVERY HR: 90 BPM
SL CV STRESS RECOVERY O2 SAT: 98 %
SL CV STRESS STAGE REACHED: 4
STRESS ANGINA INDEX: 0
STRESS BASELINE BP: NORMAL MMHG
STRESS BASELINE HR: 73 BPM
STRESS O2 SAT REST: 99 %
STRESS PEAK HR: 144 BPM
STRESS POST ESTIMATED WORKLOAD: 13.7 METS
STRESS POST EXERCISE DUR MIN: 12 MIN
STRESS POST EXERCISE DUR MIN: 12 MIN
STRESS POST EXERCISE DUR SEC: 0 SEC
STRESS POST EXERCISE DUR SEC: 0 SEC
STRESS POST O2 SAT PEAK: 98 %
STRESS POST PEAK BP: 180 MMHG
STRESS POST PEAK HR: 144 BPM
STRESS POST PEAK SYSTOLIC BP: 180 MMHG
TARGET HR FORMULA: NORMAL
TEST INDICATION: NORMAL

## 2024-09-27 PROCEDURE — 93016 CV STRESS TEST SUPVJ ONLY: CPT | Performed by: INTERNAL MEDICINE

## 2024-09-27 PROCEDURE — 93018 CV STRESS TEST I&R ONLY: CPT | Performed by: INTERNAL MEDICINE

## 2024-09-27 PROCEDURE — 93017 CV STRESS TEST TRACING ONLY: CPT

## 2024-09-30 ENCOUNTER — TELEPHONE (OUTPATIENT)
Dept: FAMILY MEDICINE CLINIC | Facility: CLINIC | Age: 34
End: 2024-09-30

## 2024-09-30 DIAGNOSIS — R07.9 CHEST PAIN, UNSPECIFIED TYPE: Primary | ICD-10-CM

## 2024-09-30 DIAGNOSIS — R00.0 TACHYCARDIA: ICD-10-CM

## 2024-09-30 NOTE — TELEPHONE ENCOUNTER
With persistent symptoms and negative stress test I would recommend seeing cardiology for further evaluation -- if interested in can place referral

## 2024-09-30 NOTE — TELEPHONE ENCOUNTER
----- Message from Rosey Mckeon PA-C sent at 9/27/2024  3:46 PM EDT -----  Stress test was negative for ischemia which is reassuring. Is he still having chest pain?   No pertinent past medical history

## 2024-11-19 ENCOUNTER — OFFICE VISIT (OUTPATIENT)
Dept: FAMILY MEDICINE CLINIC | Facility: CLINIC | Age: 34
End: 2024-11-19
Payer: COMMERCIAL

## 2024-11-19 VITALS
OXYGEN SATURATION: 100 % | DIASTOLIC BLOOD PRESSURE: 80 MMHG | SYSTOLIC BLOOD PRESSURE: 118 MMHG | HEART RATE: 85 BPM | TEMPERATURE: 97.5 F | WEIGHT: 219.4 LBS | HEIGHT: 74 IN | BODY MASS INDEX: 28.16 KG/M2

## 2024-11-19 DIAGNOSIS — F41.9 ANXIETY: Primary | ICD-10-CM

## 2024-11-19 PROCEDURE — 99214 OFFICE O/P EST MOD 30 MIN: CPT

## 2024-11-19 RX ORDER — BUSPIRONE HYDROCHLORIDE 5 MG/1
5 TABLET ORAL 2 TIMES DAILY
Qty: 60 TABLET | Refills: 0 | Status: SHIPPED | OUTPATIENT
Start: 2024-11-19

## 2024-11-19 NOTE — PROGRESS NOTES
Name: Tai Chance      : 1990      MRN: [id number]  Encounter Provider: Rosey Mckeon PA-C  Encounter Date: 2024   Encounter department: [facility]  :  Assessment & Plan  Anxiety  suspect patient's continuous symptoms of palpitations/chest tightness associated with increased stress at work are related to anxiety given negative cardiac work up and only present with increased stress levels and improvement when taking as needed ativan -- therefore we will trial buspar 5 mg BID for symptom relief/control of anxiety, discussed side effects. He has also been seeing a therapist who has been teaching him coping mechanisms which he feels have been helping; encouraged to continue following with them. Otherwise we will follow up in two weeks for re-evaluation. He is to call sooner with any questions/concerns.     Advised ER if he develop any chest pain, trouble breathing, or worsening symptoms. Patient agreeable.  I will fill out intermittent FMLA for his anxiety when forms are provided.     Orders:    busPIRone (BUSPAR) 5 mg tablet; Take 1 tablet (5 mg total) by mouth 2 (two) times a day           History of Present Illness     CC: anxiety     Patient presents to follow up with his anxiety -- he continues to have chest pressure/tightness and palpitations when he gets stressed. Patient had EKG, Holeter, stress test, and TSH completed prior for same symptoms which were all unremarkable. I referred him to a cardiologist which he is scheduled with next month.   Patient reports the symptoms seem to only be present under increased stress at work -- he is wondering if he can have intermittent FMLA for his anxiety due to work being a major stressor and causing physical symptoms.   His symptoms last a few minutes and then resolve when he calms down.   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 He is  open to trying a daily medication now.       Review of Systems   Constitutional:  Negative for chills, diaphoresis and fever.   Respiratory:  Positive for chest tightness. Negative for cough, shortness of breath and wheezing.    Cardiovascular:  Positive for palpitations. Negative for chest pain.   Gastrointestinal:  Negative for abdominal pain, blood in stool, constipation, diarrhea, nausea and vomiting.   Neurological:  Negative for dizziness, light-headedness and headaches.   Psychiatric/Behavioral:  The patient is nervous/anxious.      Medical History Reviewed by provider this encounter:  Tobacco  Allergies  Meds  Problems  Med Hx  Surg Hx  Fam Hx     .  Past Medical History   History reviewed. No pertinent past medical history.  History reviewed. No pertinent surgical history.  History reviewed. No pertinent family history.   reports that he has never smoked. He has never been exposed to tobacco smoke. He has never used smokeless tobacco. He reports that he does not currently use alcohol. He reports that he does not use drugs.  Current Outpatient Medications on File Prior to Visit   Medication Sig Dispense Refill    LORazepam (Ativan) 0.5 mg tablet Take 1 tablet (0.5 mg total) by mouth every 8 (eight) hours as needed for anxiety 30 tablet 0     No current facility-administered medications on file prior to visit.   No Known Allergies   Current Outpatient Medications on File Prior to Visit   Medication Sig Dispense Refill    LORazepam (Ativan) 0.5 mg tablet Take 1 tablet (0.5 mg total) by mouth every 8 (eight) hours as needed for anxiety 30 tablet 0     No current facility-administered medications on file prior to visit.      Social History     Tobacco Use    Smoking status: Never     Passive exposure: Never    Smokeless tobacco: Never   Vaping Use    Vaping status: Never Used   Substance and Sexual Activity    Alcohol use: Not Currently    Drug use: Never    Sexual activity: Not on file        Objective  "  /80   Pulse 85   Temp 97.5 °F (36.4 °C)   Ht 6' 2\" (1.88 m)   Wt 99.5 kg (219 lb 6.4 oz)   SpO2 100%   BMI 28.17 kg/m²      Physical Exam  Vitals reviewed.   Constitutional:       General: He is not in acute distress.     Appearance: Normal appearance. He is not ill-appearing or diaphoretic.   HENT:      Head: Normocephalic and atraumatic.      Right Ear: External ear normal.      Left Ear: External ear normal.      Nose: Nose normal.      Mouth/Throat:      Mouth: Mucous membranes are moist.   Eyes:      General:         Right eye: No discharge.         Left eye: No discharge.      Conjunctiva/sclera: Conjunctivae normal.   Cardiovascular:      Rate and Rhythm: Normal rate and regular rhythm.      Pulses: Normal pulses.      Heart sounds: Normal heart sounds. No murmur heard.  Pulmonary:      Effort: Pulmonary effort is normal. No respiratory distress.      Breath sounds: Normal breath sounds. No wheezing, rhonchi or rales.   Musculoskeletal:         General: Normal range of motion.      Cervical back: Normal range of motion.      Right lower leg: No edema.      Left lower leg: No edema.   Skin:     General: Skin is warm.   Neurological:      General: No focal deficit present.      Mental Status: He is alert.      Gait: Gait normal.   Psychiatric:         Mood and Affect: Mood normal.         "

## 2024-12-03 ENCOUNTER — OFFICE VISIT (OUTPATIENT)
Dept: FAMILY MEDICINE CLINIC | Facility: CLINIC | Age: 34
End: 2024-12-03
Payer: COMMERCIAL

## 2024-12-03 VITALS
HEART RATE: 86 BPM | TEMPERATURE: 97.7 F | DIASTOLIC BLOOD PRESSURE: 74 MMHG | OXYGEN SATURATION: 97 % | BODY MASS INDEX: 27.72 KG/M2 | WEIGHT: 216 LBS | HEIGHT: 74 IN | SYSTOLIC BLOOD PRESSURE: 120 MMHG

## 2024-12-03 DIAGNOSIS — R00.0 TACHYCARDIA: ICD-10-CM

## 2024-12-03 DIAGNOSIS — F41.9 ANXIETY: Primary | ICD-10-CM

## 2024-12-03 DIAGNOSIS — R00.2 PALPITATIONS: ICD-10-CM

## 2024-12-03 PROCEDURE — 99214 OFFICE O/P EST MOD 30 MIN: CPT

## 2024-12-03 RX ORDER — METOPROLOL SUCCINATE 25 MG/1
12.5 TABLET, EXTENDED RELEASE ORAL DAILY
Qty: 15 TABLET | Refills: 0 | Status: SHIPPED | OUTPATIENT
Start: 2024-12-03

## 2024-12-03 RX ORDER — BUSPIRONE HYDROCHLORIDE 7.5 MG/1
7.5 TABLET ORAL 2 TIMES DAILY
Qty: 60 TABLET | Refills: 0 | Status: SHIPPED | OUTPATIENT
Start: 2024-12-03

## 2024-12-03 NOTE — PROGRESS NOTES
"Name: Tai Chance      : 1990      MRN: [id number]  Encounter Provider: Rosey Mckeon PA-C  Encounter Date: 12/3/2024   Encounter department: [facility]  :  Assessment & Plan  Anxiety  Improvement in daily anxiety symptoms with decreased frequency of anxiety attacks since starting buspar 5 mg BID, minimal side effects  We will increase to buspar 7.5 mg BID for better control of anxiety  Will follow up in two weeks for re-evaluation     Orders:    busPIRone (BUSPAR) 7.5 mg tablet; Take 1 tablet (7.5 mg total) by mouth 2 (two) times a day    Palpitations  Persistent intermittent palpitations, Holter showed high burden of tachycardia -- stress test negative, normal EKG and normal TSH  Has cardiology follow up on  for further evaluation of persistent symptoms  We will trial low dose of beta blocker, toprol XL 12.5 mg QD for palpitations, discussed side effects  Will follow up in two weeks for medication check, to call sooner with any questions/concerns  Advised immediate ER if he develop any new or worsening chest pain, palpitations, trouble breathing -- pt agreeable    Orders:    metoprolol succinate (Toprol XL) 25 mg 24 hr tablet; Take 0.5 tablets (12.5 mg total) by mouth daily    Tachycardia    Orders:    metoprolol succinate (Toprol XL) 25 mg 24 hr tablet; Take 0.5 tablets (12.5 mg total) by mouth daily       History of Present Illness     CC: two week anxiety follow up     Patient presents for two week follow up for anxiety -- was started on buspar 5 mg BID. Patient reports his anxiety has improved, he reports went from having daily anxiety attacks to only about one a week. Reports now only getting anxiety attacks the  before he starts work again. He reports sometimes feels a bit fatigued with the buspar, but otherwise no side effects and he feels benefits outweigh the side effect.     He does still get episodes of palpitations and feels heart \"races\". He has an " unremarkable cardiac work up. Holter did show high burden of tachycardia. He has apt with cardio on 12/26 for persistent symptoms despite negative cardiac work up.     Review of Systems   Constitutional:  Negative for chills, diaphoresis and fever.   Respiratory:  Negative for cough, chest tightness, shortness of breath and wheezing.    Cardiovascular:  Positive for palpitations. Negative for chest pain.   Neurological:  Negative for dizziness, light-headedness and headaches.   Psychiatric/Behavioral:  Negative for self-injury and suicidal ideas.      Medical History Reviewed by provider this encounter:  Tobacco  Allergies  Meds  Problems  Med Hx  Surg Hx  Fam Hx     .  Past Medical History   History reviewed. No pertinent past medical history.  History reviewed. No pertinent surgical history.  Family History   Problem Relation Age of Onset    Thyroid cancer Mother     Heart attack Father     Diabetes Maternal Grandmother       reports that he has never smoked. He has never been exposed to tobacco smoke. He has never used smokeless tobacco. He reports that he does not currently use alcohol. He reports that he does not use drugs.  Current Outpatient Medications on File Prior to Visit   Medication Sig Dispense Refill    LORazepam (Ativan) 0.5 mg tablet Take 1 tablet (0.5 mg total) by mouth every 8 (eight) hours as needed for anxiety 30 tablet 0    [DISCONTINUED] busPIRone (BUSPAR) 5 mg tablet Take 1 tablet (5 mg total) by mouth 2 (two) times a day 60 tablet 0     No current facility-administered medications on file prior to visit.   No Known Allergies   Current Outpatient Medications on File Prior to Visit   Medication Sig Dispense Refill    LORazepam (Ativan) 0.5 mg tablet Take 1 tablet (0.5 mg total) by mouth every 8 (eight) hours as needed for anxiety 30 tablet 0    [DISCONTINUED] busPIRone (BUSPAR) 5 mg tablet Take 1 tablet (5 mg total) by mouth 2 (two) times a day 60 tablet 0     No current  "facility-administered medications on file prior to visit.      Social History     Tobacco Use    Smoking status: Never     Passive exposure: Never    Smokeless tobacco: Never   Vaping Use    Vaping status: Never Used   Substance and Sexual Activity    Alcohol use: Not Currently    Drug use: Never    Sexual activity: Not on file        Objective   /74   Pulse 86   Temp 97.7 °F (36.5 °C)   Ht 6' 2\" (1.88 m)   Wt 98 kg (216 lb)   SpO2 97%   BMI 27.73 kg/m²      Physical Exam  Vitals reviewed.   Constitutional:       General: He is not in acute distress.     Appearance: Normal appearance. He is not ill-appearing or diaphoretic.   HENT:      Head: Normocephalic and atraumatic.      Right Ear: External ear normal.      Left Ear: External ear normal.      Nose: Nose normal.      Mouth/Throat:      Mouth: Mucous membranes are moist.   Eyes:      General:         Right eye: No discharge.         Left eye: No discharge.      Conjunctiva/sclera: Conjunctivae normal.   Cardiovascular:      Rate and Rhythm: Normal rate and regular rhythm.      Pulses: Normal pulses.      Heart sounds: Normal heart sounds. No murmur heard.  Pulmonary:      Effort: Pulmonary effort is normal. No respiratory distress.      Breath sounds: Normal breath sounds. No wheezing, rhonchi or rales.   Musculoskeletal:         General: Normal range of motion.      Cervical back: Normal range of motion.      Right lower leg: No edema.      Left lower leg: No edema.   Skin:     General: Skin is warm.   Neurological:      General: No focal deficit present.      Mental Status: He is alert.      Gait: Gait normal.   Psychiatric:         Mood and Affect: Mood normal.         "

## 2024-12-18 ENCOUNTER — OFFICE VISIT (OUTPATIENT)
Dept: FAMILY MEDICINE CLINIC | Facility: CLINIC | Age: 34
End: 2024-12-18
Payer: COMMERCIAL

## 2024-12-18 VITALS
BODY MASS INDEX: 27.34 KG/M2 | DIASTOLIC BLOOD PRESSURE: 76 MMHG | TEMPERATURE: 97 F | SYSTOLIC BLOOD PRESSURE: 110 MMHG | OXYGEN SATURATION: 97 % | HEART RATE: 81 BPM | HEIGHT: 74 IN | WEIGHT: 213 LBS

## 2024-12-18 DIAGNOSIS — Z86.0100 HISTORY OF COLONIC POLYPS: ICD-10-CM

## 2024-12-18 DIAGNOSIS — F41.9 ANXIETY: ICD-10-CM

## 2024-12-18 DIAGNOSIS — R19.4 BOWEL HABIT CHANGES: Primary | ICD-10-CM

## 2024-12-18 DIAGNOSIS — R10.12 LUQ PAIN: ICD-10-CM

## 2024-12-18 DIAGNOSIS — R00.2 PALPITATIONS: ICD-10-CM

## 2024-12-18 PROCEDURE — 99214 OFFICE O/P EST MOD 30 MIN: CPT

## 2024-12-18 NOTE — ASSESSMENT & PLAN NOTE
Improvement since increasing to buspar to 7.5 mg BID -- no panic attacks in the last two weeks and has not missed as much work which is good  We will continue current buspar at current dosage  Continue talk therapy as he continues to learn coping mechanisms which I feel are beneficial   Follow up in one month, sooner as needed

## 2024-12-18 NOTE — PROGRESS NOTES
"Name: Tai Chance      : 1990      MRN: [id number]  Encounter Provider: Rosey Mckeon PA-C  Encounter Date: 2024   Encounter department: [facility]  :  Assessment & Plan  Bowel habit changes  Reports for the last 3-4 weeks feels stool have been \"skinnier\" and he has been having incomplete emptying; he does report prior history of colonic polyps however unfortunately no prior colonoscopy report to review   Also complaining of LUQ/LLQ \"discomfort\" intermittently for the same time period -- abdomen non-tender on exam today with normal active bowel sounds   Ordered US of abdomen for further evaluation and placed referral to GI, with these alarm symptoms I do feel he would benefit from a repeat colonoscopy which was discussed today -- advised to schedule with GI asap   Did discuss daily fiber supplement and probiotic as well     Orders:    Ambulatory Referral to Gastroenterology; Future    History of colonic polyps  See above plan     Orders:    Ambulatory Referral to Gastroenterology; Future    LUQ pain  See above plan     Orders:    US abdomen complete; Future    Anxiety  Improvement since increasing to buspar to 7.5 mg BID -- no panic attacks in the last two weeks and has not missed as much work which is good  We will continue current buspar at current dosage  Continue talk therapy as he continues to learn coping mechanisms which I feel are beneficial   Follow up in one month, sooner as needed       Palpitations  Reports improvement in palpitations and less frequent episodes since starting the Toprol; denies any side effects  Therefore continue current Toprol 12.5 mg QD  Does have a follow up with cardiology on  to rule out cardiac cause, however I suspect palpitations are related to patient's anxiety   Follow up in one month, sooner as needed              History of Present Illness     CC: anxiety, palpitations, bowel changes    Patient presents for follow up of anxiety and " "palpitations. At last visit, we increased his buspar to 7.5 mg BID and started toprol XL 12.5 mg QD for symptoms. Patient reports anxiety has improved and in the last week he has only had one episode of mild \"fluttering\" but went away within a few seconds. Reports he has only had to miss work once in the last two weeks for symptoms which is an improvement. He has not noticed any side effects of the medication. He does have an appointment with cardiology next week, 12/26/24 for further evaluation of the palpitations however I do suspect they are anxiety related.     Patient also reports for the last month he has noticed changes in his bowel habits -- reports even after he goes he feels \"incomplete emptying\". Reports tool has also appeared a lot \"banegas\" than usual. Denies constipation or diarrhea. He did try metamucil which didn't help. Reports intermittent \"discomfort\" in the LLQ/LUQ as well at times. He denies blood in stool. Reports he has also lost about 6 lbs this past month unintentionally which he is not sure if related. Reports he did have a colonoscopy about seven years ago where they found multiple polyps.       Review of Systems   Constitutional:  Negative for chills, diaphoresis and fever.   Respiratory:  Negative for chest tightness, shortness of breath and wheezing.    Cardiovascular:  Positive for palpitations. Negative for chest pain.   Neurological:  Negative for dizziness, light-headedness and headaches.       Objective   /76 (BP Location: Left arm, Patient Position: Sitting, Cuff Size: Large)   Pulse 81   Temp (!) 97 °F (36.1 °C)   Ht 6' 2\" (1.88 m)   Wt 96.6 kg (213 lb)   SpO2 97%   BMI 27.35 kg/m²      Physical Exam  Vitals reviewed.   Constitutional:       General: He is not in acute distress.     Appearance: Normal appearance. He is not ill-appearing or diaphoretic.   HENT:      Head: Normocephalic and atraumatic.      Right Ear: External ear normal.      Left Ear: External ear " normal.      Nose: Nose normal.      Mouth/Throat:      Mouth: Mucous membranes are moist.   Eyes:      General:         Right eye: No discharge.         Left eye: No discharge.      Conjunctiva/sclera: Conjunctivae normal.   Cardiovascular:      Rate and Rhythm: Normal rate and regular rhythm.      Pulses: Normal pulses.      Heart sounds: Normal heart sounds. No murmur heard.  Pulmonary:      Effort: Pulmonary effort is normal. No respiratory distress.      Breath sounds: Normal breath sounds. No wheezing, rhonchi or rales.   Abdominal:      General: Bowel sounds are normal. There is no distension.      Palpations: Abdomen is soft. There is no mass.      Tenderness: There is no abdominal tenderness. There is no guarding or rebound.      Hernia: No hernia is present.   Musculoskeletal:         General: Normal range of motion.      Cervical back: Normal range of motion.      Right lower leg: No edema.      Left lower leg: No edema.   Skin:     General: Skin is warm.   Neurological:      General: No focal deficit present.      Mental Status: He is alert and oriented to person, place, and time.      Gait: Gait normal.   Psychiatric:         Mood and Affect: Mood normal.

## 2024-12-18 NOTE — ASSESSMENT & PLAN NOTE
Reports improvement in palpitations and less frequent episodes since starting the Toprol; denies any side effects  Therefore continue current Toprol 12.5 mg QD  Does have a follow up with cardiology on 12/26 to rule out cardiac cause, however I suspect palpitations are related to patient's anxiety   Follow up in one month, sooner as needed

## 2024-12-26 ENCOUNTER — TELEPHONE (OUTPATIENT)
Age: 34
End: 2024-12-26

## 2024-12-26 NOTE — TELEPHONE ENCOUNTER
Patient called stating that he received a message form [employer] that they have not received his disability forms.     I informed patient disability forms were faxed on 12/28 at 1605. Patient is requesting if forms can be re faxed ASAP.     I spoke with office staff who stated they will re fax forms.

## 2024-12-26 NOTE — TELEPHONE ENCOUNTER
"Hello,    The following message was sent via e-mail to the leadership team:     Please advise if you can help facilitate the following overbook request:    Patient Name: Tai Chance    Patient MRN: [id number]    Call back #: [phone]    Insurance: [employer] [place]     Department:Cardiology    Speciality: General Cardiology    Reason for overbook request: PATIENT REQUEST    Comments (Write \"N/a\" if no comments): Patient was scheduled for 12/26/24 with Dr. Mendoza in [place], but had to cancel his appointment as it was in the wrong location. Patient originally thought he scheduled his appointment in the [place] office. Patient was upset that he waited 2 months for this appointment and is not scheduled to be seen in the [place] office until February 2025. Patient went to [place] and noticed that the office was closed and then checked MyChart and discovered that his appointment was over in [place]. Patient requested to speak to the manager of [place] about this issue as he was frustrated that when he called in to confirm his appointment, he was told his appointment was in [place].     Requested doctor and location: Any Doctor/[place]    Date of current appointment: 2/7/25      Thank you.      "

## 2024-12-29 ENCOUNTER — APPOINTMENT (EMERGENCY)
Dept: CT IMAGING | Facility: HOSPITAL | Age: 34
End: 2024-12-29
Payer: COMMERCIAL

## 2024-12-29 ENCOUNTER — HOSPITAL ENCOUNTER (EMERGENCY)
Facility: HOSPITAL | Age: 34
Discharge: HOME | End: 2024-12-29
Attending: EMERGENCY MEDICINE | Admitting: EMERGENCY MEDICINE
Payer: COMMERCIAL

## 2024-12-29 VITALS
RESPIRATION RATE: 16 BRPM | TEMPERATURE: 97.6 F | HEART RATE: 68 BPM | OXYGEN SATURATION: 98 % | SYSTOLIC BLOOD PRESSURE: 137 MMHG | DIASTOLIC BLOOD PRESSURE: 91 MMHG

## 2024-12-29 DIAGNOSIS — R10.9 LEFT SIDED ABDOMINAL PAIN: Primary | ICD-10-CM

## 2024-12-29 LAB
ALBUMIN SERPL BCG-MCNC: 4.6 G/DL (ref 3.5–5)
ALP SERPL-CCNC: 45 U/L (ref 34–104)
ALT SERPL W P-5'-P-CCNC: 15 U/L (ref 7–52)
ANION GAP SERPL CALCULATED.3IONS-SCNC: 5 MMOL/L (ref 4–13)
AST SERPL W P-5'-P-CCNC: 13 U/L (ref 13–39)
BASOPHILS # BLD AUTO: 0.03 THOUSANDS/ΜL (ref 0–0.1)
BASOPHILS NFR BLD AUTO: 1 % (ref 0–1)
BILIRUB SERPL-MCNC: 0.48 MG/DL (ref 0.2–1)
BILIRUB UR QL STRIP: NEGATIVE
BUN SERPL-MCNC: 15 MG/DL (ref 5–25)
CALCIUM SERPL-MCNC: 9.7 MG/DL (ref 8.4–10.2)
CHLORIDE SERPL-SCNC: 106 MMOL/L (ref 96–108)
CLARITY UR: CLEAR
CO2 SERPL-SCNC: 28 MMOL/L (ref 21–32)
COLOR UR: YELLOW
CREAT SERPL-MCNC: 1.17 MG/DL (ref 0.6–1.3)
EOSINOPHIL # BLD AUTO: 0.03 THOUSAND/ΜL (ref 0–0.61)
EOSINOPHIL NFR BLD AUTO: 1 % (ref 0–6)
ERYTHROCYTE [DISTWIDTH] IN BLOOD BY AUTOMATED COUNT: 12.2 % (ref 11.6–15.1)
GFR SERPL CREATININE-BSD FRML MDRD: 79 ML/MIN/1.73SQ M
GLUCOSE SERPL-MCNC: 98 MG/DL (ref 65–140)
GLUCOSE UR STRIP-MCNC: NEGATIVE MG/DL
HCT VFR BLD AUTO: 44.8 % (ref 36.5–49.3)
HGB BLD-MCNC: 14.6 G/DL (ref 12–17)
HGB UR QL STRIP.AUTO: NEGATIVE
IMM GRANULOCYTES # BLD AUTO: 0.02 THOUSAND/UL (ref 0–0.2)
IMM GRANULOCYTES NFR BLD AUTO: 1 % (ref 0–2)
KETONES UR STRIP-MCNC: NEGATIVE MG/DL
LEUKOCYTE ESTERASE UR QL STRIP: NEGATIVE
LIPASE SERPL-CCNC: 41 U/L (ref 11–82)
LYMPHOCYTES # BLD AUTO: 1.56 THOUSANDS/ΜL (ref 0.6–4.47)
LYMPHOCYTES NFR BLD AUTO: 42 % (ref 14–44)
MCH RBC QN AUTO: 28.2 PG (ref 26.8–34.3)
MCHC RBC AUTO-ENTMCNC: 32.6 G/DL (ref 31.4–37.4)
MCV RBC AUTO: 87 FL (ref 82–98)
MONOCYTES # BLD AUTO: 0.25 THOUSAND/ΜL (ref 0.17–1.22)
MONOCYTES NFR BLD AUTO: 7 % (ref 4–12)
NEUTROPHILS # BLD AUTO: 1.85 THOUSANDS/ΜL (ref 1.85–7.62)
NEUTS SEG NFR BLD AUTO: 48 % (ref 43–75)
NITRITE UR QL STRIP: NEGATIVE
NRBC BLD AUTO-RTO: 0 /100 WBCS
PH UR STRIP.AUTO: 6 [PH]
PLATELET # BLD AUTO: 332 THOUSANDS/UL (ref 149–390)
PMV BLD AUTO: 8.7 FL (ref 8.9–12.7)
POTASSIUM SERPL-SCNC: 3.8 MMOL/L (ref 3.5–5.3)
PROT SERPL-MCNC: 7.9 G/DL (ref 6.4–8.4)
PROT UR STRIP-MCNC: NEGATIVE MG/DL
RBC # BLD AUTO: 5.18 MILLION/UL (ref 3.88–5.62)
SODIUM SERPL-SCNC: 139 MMOL/L (ref 135–147)
SP GR UR STRIP.AUTO: 1.01
UROBILINOGEN UR QL STRIP.AUTO: 0.2 E.U./DL
WBC # BLD AUTO: 3.74 THOUSAND/UL (ref 4.31–10.16)

## 2024-12-29 PROCEDURE — 99285 EMERGENCY DEPT VISIT HI MDM: CPT

## 2024-12-29 PROCEDURE — 85025 COMPLETE CBC W/AUTO DIFF WBC: CPT

## 2024-12-29 PROCEDURE — 36415 COLL VENOUS BLD VENIPUNCTURE: CPT

## 2024-12-29 PROCEDURE — 83690 ASSAY OF LIPASE: CPT

## 2024-12-29 PROCEDURE — 74177 CT ABD & PELVIS W/CONTRAST: CPT

## 2024-12-29 PROCEDURE — 80053 COMPREHEN METABOLIC PANEL: CPT

## 2024-12-29 PROCEDURE — 81003 URINALYSIS AUTO W/O SCOPE: CPT

## 2024-12-29 PROCEDURE — 99284 EMERGENCY DEPT VISIT MOD MDM: CPT

## 2024-12-29 RX ORDER — ONDANSETRON 4 MG/1
4 TABLET, ORALLY DISINTEGRATING ORAL EVERY 6 HOURS PRN
Qty: 20 TABLET | Refills: 0 | Status: SHIPPED | OUTPATIENT
Start: 2024-12-29

## 2024-12-29 RX ADMIN — IOHEXOL 100 ML: 350 INJECTION, SOLUTION INTRAVENOUS at 10:40

## 2024-12-29 NOTE — ED PROVIDER NOTES
Time reflects when diagnosis was documented in both MDM as applicable and the Disposition within this note       Time User Action Codes Description Comment    12/29/2024 11:33 AM Kely Arreola Add [R10.9] Left sided abdominal pain           ED Disposition       ED Disposition   Discharge    Condition   Stable    Date/Time   Sun Dec 29, 2024 11:33 AM    Comment   Tai Chance discharge to home/self care.                   Assessment & Plan       Medical Decision Making  Patient is a 34-year-old male presenting with left-sided abdominal and flank pain with decreased appetite, weight loss, nausea for the last 3 weeks.  He was initially tachycardic on arrival which is improved without intervention, remaining vitals within normal limits.    DDx: Diverticulitis, colitis, ureteral calculi, UTI, appendicitis, malignancy.  Plan: CBC, CMP, lipase, UA, CT abdomen pelvis.    Mild leukopenia 3.74.  Remaining labs without abnormality.  UA without evidence of infection.  CT without acute intra-abdominal or intrapelvic abnormality.  Discussed this with patient and provided reassurance.  Emphasized need for repeat colonoscopy given symptoms.  He does have GI appointment tomorrow.  Will discharge with Zofran and advised to continue Motrin and Tylenol for pain.    I have discussed findings and plan for discharge with the patient/caregiver. Follow up with the appropriate providers including primary care physician was discussed. Return precautions discussed with patient/caregiver as outlined in AVS. Patient/caregiver verbally expressed understanding. Patient stable at time of discharge and ambulated out of the emergency department.     Amount and/or Complexity of Data Reviewed  Labs: ordered. Decision-making details documented in ED Course.  Radiology: ordered. Decision-making details documented in ED Course.    Risk  Prescription drug management.        ED Course as of 12/29/24 1657   Sun Dec 29, 2024   1023 UA w Reflex to Microscopic  w Reflex to Culture  Negative.   1051 WBC(!): 3.74   1051 Comprehensive metabolic panel  Normal.   1133 CT abdomen pelvis w contrast  IMPRESSION:     1. No acute intra-abdominal or intrapelvic abnormality  2. Degenerative disc disease and mild retrolisthesis, L5-S1         Medications   iohexol (OMNIPAQUE) 350 MG/ML injection (MULTI-DOSE) 100 mL (100 mL Intravenous Given 12/29/24 1040)       ED Risk Strat Scores                                              History of Present Illness       Chief Complaint   Patient presents with    Abdominal Pain    Bloated    Flank Pain     Left flank pain       Past Medical History:   Diagnosis Date    Murmur, cardiac       Past Surgical History:   Procedure Laterality Date    COLONOSCOPY        Family History   Problem Relation Age of Onset    Thyroid cancer Mother     Heart attack Father     Diabetes Maternal Grandmother       Social History     Tobacco Use    Smoking status: Never     Passive exposure: Never    Smokeless tobacco: Never   Vaping Use    Vaping status: Never Used   Substance Use Topics    Alcohol use: Yes     Comment: occasionally    Drug use: Yes     Types: Marijuana      E-Cigarette/Vaping    E-Cigarette Use Never User       E-Cigarette/Vaping Substances      I have reviewed and agree with the history as documented.     Patient is a 34-year-old male without significant past medical history presenting for evaluation of left-sided abdominal pain for the last 3 weeks.  Pain occasionally radiates into the leg.  He has a constant discomfort with occasional flares of sharp pain.  He is also reporting constipation, straining, thin stools.  He is noted decreased appetite and unintentional weight loss.  He has associated nausea.  No vomiting or urinary symptoms.  No fevers, chills, URI symptoms, chest pain, shortness of breath.  He did have a colonoscopy about 7 years ago where they removed multiple polyps but did not follow-up 3 years later for repeat.      Abdominal  Pain  Associated symptoms: constipation and nausea    Associated symptoms: no chest pain, no chills, no cough, no diarrhea, no dysuria, no fever, no hematuria, no shortness of breath, no sore throat and no vomiting    Flank Pain  Associated symptoms: constipation and nausea    Associated symptoms: no chest pain, no chills, no cough, no diarrhea, no dysuria, no fever, no hematuria, no shortness of breath, no sore throat and no vomiting        Review of Systems   Constitutional:  Positive for appetite change and unexpected weight change. Negative for chills and fever.   HENT:  Negative for congestion, ear pain and sore throat.    Eyes:  Negative for pain and visual disturbance.   Respiratory:  Negative for cough and shortness of breath.    Cardiovascular:  Negative for chest pain and leg swelling.   Gastrointestinal:  Positive for abdominal pain, constipation and nausea. Negative for blood in stool, diarrhea, rectal pain and vomiting.   Genitourinary:  Positive for flank pain. Negative for dysuria and hematuria.   Musculoskeletal:  Negative for back pain and neck pain.   Skin:  Negative for rash.   Neurological:  Negative for dizziness and headaches.   Psychiatric/Behavioral:  Negative for confusion.            Objective       ED Triage Vitals   Temperature Pulse Blood Pressure Respirations SpO2 Patient Position - Orthostatic VS   12/29/24 0952 12/29/24 0953 12/29/24 0953 12/29/24 0953 12/29/24 0953 12/29/24 0953   97.6 °F (36.4 °C) (!) 115 137/91 16 97 % Sitting      Temp Source Heart Rate Source BP Location FiO2 (%) Pain Score    12/29/24 0952 12/29/24 0953 12/29/24 0953 -- 12/29/24 0953    Temporal Monitor Left arm  7      Vitals      Date and Time Temp Pulse SpO2 Resp BP Pain Score FACES Pain Rating User   12/29/24 1130 -- 68 98 % 16 137/91 -- -- J   12/29/24 1013 -- 97 96 % -- -- -- -- DK   12/29/24 1000 -- 103 96 % 18 137/91 -- -- JJ   12/29/24 0953 -- 115 97 % 16 137/91 7 -- NAD   12/29/24 0952 97.6 °F (36.4  °C) -- -- -- -- -- -- NAD            Physical Exam  Vitals and nursing note reviewed.   Constitutional:       General: He is not in acute distress.     Appearance: Normal appearance. He is toxic-appearing.   HENT:      Head: Normocephalic and atraumatic.      Right Ear: External ear normal.      Left Ear: External ear normal.      Nose: Nose normal.      Mouth/Throat:      Mouth: Mucous membranes are moist.   Eyes:      General: No scleral icterus.        Right eye: No discharge.         Left eye: No discharge.   Cardiovascular:      Rate and Rhythm: Normal rate and regular rhythm.      Pulses: Normal pulses.      Heart sounds: Normal heart sounds.   Pulmonary:      Effort: Pulmonary effort is normal. No respiratory distress.      Breath sounds: Normal breath sounds.   Abdominal:      Palpations: Abdomen is soft.      Tenderness: There is abdominal tenderness in the suprapubic area, left upper quadrant and left lower quadrant. There is no left CVA tenderness, guarding or rebound.   Musculoskeletal:         General: No tenderness, deformity or signs of injury.      Cervical back: Normal range of motion and neck supple.   Skin:     General: Skin is dry.      Coloration: Skin is not jaundiced.      Findings: No erythema or rash.   Neurological:      General: No focal deficit present.      Mental Status: He is alert and oriented to person, place, and time. Mental status is at baseline.      Motor: No weakness.      Gait: Gait normal.   Psychiatric:         Mood and Affect: Mood normal.         Behavior: Behavior normal.         Thought Content: Thought content normal.         Results Reviewed       Procedure Component Value Units Date/Time    Comprehensive metabolic panel [[id number]] Collected: 12/29/24 1013    Lab Status: Final result Specimen: Blood from Arm, Left Updated: 12/29/24 1035     Sodium 139 mmol/L      Potassium 3.8 mmol/L      Chloride 106 mmol/L      CO2 28 mmol/L      ANION GAP 5 mmol/L      BUN 15 mg/dL       Creatinine 1.17 mg/dL      Glucose 98 mg/dL      Calcium 9.7 mg/dL      AST 13 U/L      ALT 15 U/L      Alkaline Phosphatase 45 U/L      Total Protein 7.9 g/dL      Albumin 4.6 g/dL      Total Bilirubin 0.48 mg/dL      eGFR 79 ml/min/1.73sq m     Narrative:      National Kid[employer] Disease Foundation guidelines for Chronic Kidney Disease (CKD):     Stage 1 with normal or high GFR (GFR > 90 mL/min/1.73 square meters)    Stage 2 Mild CKD (GFR = 60-89 mL/min/1.73 square meters)    Stage 3A Moderate CKD (GFR = 45-59 mL/min/1.73 square meters)    Stage 3B Moderate CKD (GFR = 30-44 mL/min/1.73 square meters)    Stage 4 Severe CKD (GFR = 15-29 mL/min/1.73 square meters)    Stage 5 End Stage CKD (GFR <15 mL/min/1.73 square meters)  Note: GFR calculation is accurate only with a steady state creatinine    Lipase [[id number]]  (Normal) Collected: 12/29/24 1013    Lab Status: Final result Specimen: Blood from Arm, Left Updated: 12/29/24 1035     Lipase 41 u/L     UA w Reflex to Microscopic w Reflex to Culture [[id number]]  (Normal) Collected: 12/29/24 1015    Lab Status: Final result Specimen: Urine, Other Updated: 12/29/24 1023     Color, UA Yellow     Clarity, UA Clear     Specific Gravity, UA 1.010     pH, UA 6.0     Leukocytes, UA Negative     Nitrite, UA Negative     Protein, UA Negative mg/dl      Glucose, UA Negative mg/dl      Ketones, UA Negative mg/dl      Urobilinogen, UA 0.2 E.U./dl      Bilirubin, UA Negative     Occult Blood, UA Negative    CBC and differential [[id number]]  (Abnormal) Collected: 12/29/24 1013    Lab Status: Final result Specimen: Blood from Arm, Left Updated: 12/29/24 1018     WBC 3.74 Thousand/uL      RBC 5.18 Million/uL      Hemoglobin 14.6 g/dL      Hematocrit 44.8 %      MCV 87 fL      MCH 28.2 pg      MCHC 32.6 g/dL      RDW 12.2 %      MPV 8.7 fL      Platelets 332 Thousands/uL      nRBC 0 /100 WBCs      Segmented % 48 %      Immature Grans % 1 %      Lymphocytes % 42 %      Monocytes % 7 %       Eosinophils Relative 1 %      Basophils Relative 1 %      Absolute Neutrophils 1.85 Thousands/µL      Absolute Immature Grans 0.02 Thousand/uL      Absolute Lymphocytes 1.56 Thousands/µL      Absolute Monocytes 0.25 Thousand/µL      Eosinophils Absolute 0.03 Thousand/µL      Basophils Absolute 0.03 Thousands/µL             CT abdomen pelvis w contrast   Final Interpretation by Farhan Avendaño MD (12/29 1123)      1. No acute intra-abdominal or intrapelvic abnormality   2. Degenerative disc disease and mild retrolisthesis, L5-S1               Workstation performed: [id number]             Procedures    ED Medication and Procedure Management   Prior to Admission Medications   Prescriptions Last Dose Informant Patient Reported? Taking?   LORazepam (Ativan) 0.5 mg tablet Not Taking  No No   Sig: Take 1 tablet (0.5 mg total) by mouth every 8 (eight) hours as needed for anxiety   Patient not taking: Reported on 12/29/2024   busPIRone (BUSPAR) 7.5 mg tablet   No No   Sig: Take 1 tablet (7.5 mg total) by mouth 2 (two) times a day   metoprolol succinate (Toprol XL) 25 mg 24 hr tablet   No No   Sig: Take 0.5 tablets (12.5 mg total) by mouth daily      Facility-Administered Medications: None     Discharge Medication List as of 12/29/2024 11:36 AM        CONTINUE these medications which have NOT CHANGED    Details   busPIRone (BUSPAR) 7.5 mg tablet Take 1 tablet (7.5 mg total) by mouth 2 (two) times a day, Starting Tue 12/3/2024, Normal      LORazepam (Ativan) 0.5 mg tablet Take 1 tablet (0.5 mg total) by mouth every 8 (eight) hours as needed for anxiety, Starting Tue 9/17/2024, Normal      metoprolol succinate (Toprol XL) 25 mg 24 hr tablet Take 0.5 tablets (12.5 mg total) by mouth daily, Starting Tue 12/3/2024, Normal           No discharge procedures on file.  ED SEPSIS DOCUMENTATION   Time reflects when diagnosis was documented in both MDM as applicable and the Disposition within this note       Time User Action Codes  Description Comment    12/29/2024 11:33 AM Kely Arreola Add [R10.9] Left sided abdominal pain                  Kely Arreola PA-C  12/29/24 7805

## 2024-12-30 ENCOUNTER — CONSULT (OUTPATIENT)
Dept: GASTROENTEROLOGY | Facility: CLINIC | Age: 34
End: 2024-12-30
Payer: COMMERCIAL

## 2024-12-30 VITALS
OXYGEN SATURATION: 98 % | DIASTOLIC BLOOD PRESSURE: 83 MMHG | WEIGHT: 207.6 LBS | HEART RATE: 78 BPM | TEMPERATURE: 97.5 F | SYSTOLIC BLOOD PRESSURE: 127 MMHG | BODY MASS INDEX: 40.76 KG/M2 | HEIGHT: 60 IN

## 2024-12-30 DIAGNOSIS — R19.4 BOWEL HABIT CHANGES: ICD-10-CM

## 2024-12-30 DIAGNOSIS — Z86.0100 HISTORY OF COLONIC POLYPS: ICD-10-CM

## 2024-12-30 DIAGNOSIS — R10.32 LLQ PAIN: Primary | ICD-10-CM

## 2024-12-30 PROCEDURE — 99203 OFFICE O/P NEW LOW 30 MIN: CPT | Performed by: PHYSICIAN ASSISTANT

## 2024-12-30 NOTE — H&P (VIEW-ONLY)
Name: Tai Chance      : 1990      MRN: [id number]  Encounter Provider: Meg Kumar PA-C  Encounter Date: 2024   Encounter department: [facility] [place]  :  Assessment & Plan  Bowel habit changes  Due to personal history of colon polyps and change in bowel habits, we will plan for colonoscopy to investigate.  Patient reports that he will be very mindful about continuing follow-up for colonoscopy especially with his personal history of polyps. I obtained informed consent from the patient. The risks/benefits/alternatives of the procedure were discussed with the patient. Risks included, but not limited to, infection, bleeding, perforation, injury to organs in the abdomen, missed lesion and incomplete procedure were discussed. Patient was agreeable.  -Colonoscopy with MiraLAX prep instructions  -Further recommendations based on above  -If bowel habits are consistently regular, may be eligible for prescription laxative at follow-up  -He agrees with plan  History of colonic polyps    Orders:    Ambulatory Referral to Gastroenterology    Colonoscopy; Future    Follow-up after colonoscopy.      History of Present Illness   HPI  Tai Chance is a 34 y.o. male who presents for change in bowel habits and left lower quadrant discomfort.  Patient reports over the last month he has had a change in bowel habits with thinner stools and straining.  Patient reports a fullness feeling, and lost 6 pounds unintentionally due to the fullness that he has been experiencing.  He denies signs or GI bleeding.  There is no family history of GI malignancy to his knowledge.    Patient reports that after he tried taking smooth move, he had intense left lower quadrant cramping that prompted him to be seen in the emergency room.  When patient presented to the emergency room on , CT imaging was performed revealing no acute intra-abdominal pathology.  There is note of degenerative  "disc disease at L5-S1 on the scan.  Hemoglobin 14.6 in the emergency room.  TSH performed in September was within normal limits.  Fortunately, his abdominal pain is improved compared to when he presented to the emergency room, but he has residual discomfort that he describes as a \"lynn\" sensation.    Patient reports that 7 years ago when he was living in [place] he had a colonoscopy due to change in bowel habits at the time.  He was diagnosed with 7 polyps.  He was told that they were benign, but needed a 3-year follow-up.  Records were not available at the time of consultation today.      Review of Systems   Constitutional:  Positive for unexpected weight change. Negative for appetite change, chills, diaphoresis and fatigue.   HENT:  Negative for sore throat and trouble swallowing.    Eyes:  Negative for discharge and redness.   Respiratory:  Negative for cough, shortness of breath and wheezing.    Cardiovascular:  Negative for chest pain and palpitations.   Gastrointestinal:  Positive for abdominal pain and constipation. Negative for anal bleeding, blood in stool, diarrhea, nausea, rectal pain and vomiting.   Endocrine: Negative for cold intolerance and heat intolerance.   Musculoskeletal:  Negative for joint swelling and myalgias.   Skin:  Negative for pallor and rash.   Neurological:  Negative for dizziness, tremors, weakness, light-headedness, numbness and headaches.   Hematological:  Negative for adenopathy. Does not bruise/bleed easily.   Psychiatric/Behavioral:  Negative for behavioral problems, confusion, dysphoric mood and sleep disturbance. The patient is not nervous/anxious.           Objective   Ht 3' 2\" (0.965 m)   .71 kg/m²      Physical Exam  Constitutional:       General: He is not in acute distress.     Appearance: He is well-developed. He is not diaphoretic.   HENT:      Head: Normocephalic and atraumatic.   Eyes:      General: No scleral icterus.     Conjunctiva/sclera: " Conjunctivae normal.      Pupils: Pupils are equal, round, and reactive to light.   Neck:      Thyroid: No thyromegaly.      Trachea: No tracheal deviation.   Cardiovascular:      Rate and Rhythm: Normal rate and regular rhythm.   Pulmonary:      Effort: Pulmonary effort is normal.      Breath sounds: Normal breath sounds. No wheezing or rales.   Abdominal:      General: Bowel sounds are normal. There is no distension.      Palpations: Abdomen is soft. Abdomen is not rigid. There is no mass.      Tenderness: There is no abdominal tenderness. There is no guarding or rebound.   Musculoskeletal:      Cervical back: Neck supple.   Lymphadenopathy:      Cervical: No cervical adenopathy.   Skin:     General: Skin is warm and dry.      Findings: No erythema or rash.   Neurological:      Mental Status: He is alert and oriented to person, place, and time.   Psychiatric:         Behavior: Behavior normal.

## 2024-12-30 NOTE — PATIENT INSTRUCTIONS
Scheduled date of colonoscopy (as of today):1/3/25  Physician performing colonoscopy:Selene  Location of colonoscopy:[place]  Bowel prep reviewed with patient:miralax/dulcolax  Instructions reviewed with patient by:Kayleigh LAM  Clearances:  none

## 2024-12-30 NOTE — PROGRESS NOTES
Name: Tai Chance      : 1990      MRN: [id number]  Encounter Provider: Meg Kumar PA-C  Encounter Date: 2024   Encounter department: [facility] [place]  :  Assessment & Plan  Bowel habit changes  Due to personal history of colon polyps and change in bowel habits, we will plan for colonoscopy to investigate.  Patient reports that he will be very mindful about continuing follow-up for colonoscopy especially with his personal history of polyps. I obtained informed consent from the patient. The risks/benefits/alternatives of the procedure were discussed with the patient. Risks included, but not limited to, infection, bleeding, perforation, injury to organs in the abdomen, missed lesion and incomplete procedure were discussed. Patient was agreeable.  -Colonoscopy with MiraLAX prep instructions  -Further recommendations based on above  -If bowel habits are consistently regular, may be eligible for prescription laxative at follow-up  -He agrees with plan  History of colonic polyps    Orders:    Ambulatory Referral to Gastroenterology    Colonoscopy; Future    Follow-up after colonoscopy.      History of Present Illness   HPI  Tai Chance is a 34 y.o. male who presents for change in bowel habits and left lower quadrant discomfort.  Patient reports over the last month he has had a change in bowel habits with thinner stools and straining.  Patient reports a fullness feeling, and lost 6 pounds unintentionally due to the fullness that he has been experiencing.  He denies signs or GI bleeding.  There is no family history of GI malignancy to his knowledge.    Patient reports that after he tried taking smooth move, he had intense left lower quadrant cramping that prompted him to be seen in the emergency room.  When patient presented to the emergency room on , CT imaging was performed revealing no acute intra-abdominal pathology.  There is note of degenerative  "disc disease at L5-S1 on the scan.  Hemoglobin 14.6 in the emergency room.  TSH performed in September was within normal limits.  Fortunately, his abdominal pain is improved compared to when he presented to the emergency room, but he has residual discomfort that he describes as a \"lynn\" sensation.    Patient reports that 7 years ago when he was living in [place] he had a colonoscopy due to change in bowel habits at the time.  He was diagnosed with 7 polyps.  He was told that they were benign, but needed a 3-year follow-up.  Records were not available at the time of consultation today.      Review of Systems   Constitutional:  Positive for unexpected weight change. Negative for appetite change, chills, diaphoresis and fatigue.   HENT:  Negative for sore throat and trouble swallowing.    Eyes:  Negative for discharge and redness.   Respiratory:  Negative for cough, shortness of breath and wheezing.    Cardiovascular:  Negative for chest pain and palpitations.   Gastrointestinal:  Positive for abdominal pain and constipation. Negative for anal bleeding, blood in stool, diarrhea, nausea, rectal pain and vomiting.   Endocrine: Negative for cold intolerance and heat intolerance.   Musculoskeletal:  Negative for joint swelling and myalgias.   Skin:  Negative for pallor and rash.   Neurological:  Negative for dizziness, tremors, weakness, light-headedness, numbness and headaches.   Hematological:  Negative for adenopathy. Does not bruise/bleed easily.   Psychiatric/Behavioral:  Negative for behavioral problems, confusion, dysphoric mood and sleep disturbance. The patient is not nervous/anxious.           Objective   Ht 3' 2\" (0.965 m)   .71 kg/m²      Physical Exam  Constitutional:       General: He is not in acute distress.     Appearance: He is well-developed. He is not diaphoretic.   HENT:      Head: Normocephalic and atraumatic.   Eyes:      General: No scleral icterus.     Conjunctiva/sclera: " Conjunctivae normal.      Pupils: Pupils are equal, round, and reactive to light.   Neck:      Thyroid: No thyromegaly.      Trachea: No tracheal deviation.   Cardiovascular:      Rate and Rhythm: Normal rate and regular rhythm.   Pulmonary:      Effort: Pulmonary effort is normal.      Breath sounds: Normal breath sounds. No wheezing or rales.   Abdominal:      General: Bowel sounds are normal. There is no distension.      Palpations: Abdomen is soft. Abdomen is not rigid. There is no mass.      Tenderness: There is no abdominal tenderness. There is no guarding or rebound.   Musculoskeletal:      Cervical back: Neck supple.   Lymphadenopathy:      Cervical: No cervical adenopathy.   Skin:     General: Skin is warm and dry.      Findings: No erythema or rash.   Neurological:      Mental Status: He is alert and oriented to person, place, and time.   Psychiatric:         Behavior: Behavior normal.

## 2024-12-31 ENCOUNTER — HOSPITAL ENCOUNTER (OUTPATIENT)
Dept: ULTRASOUND IMAGING | Facility: HOSPITAL | Age: 34
Discharge: HOME | End: 2024-12-31
Payer: COMMERCIAL

## 2024-12-31 DIAGNOSIS — R10.12 LUQ PAIN: ICD-10-CM

## 2024-12-31 PROCEDURE — 76700 US EXAM ABDOM COMPLETE: CPT

## 2025-01-02 ENCOUNTER — RESULTS FOLLOW-UP (OUTPATIENT)
Dept: FAMILY MEDICINE CLINIC | Facility: CLINIC | Age: 35
End: 2025-01-02

## 2025-01-03 ENCOUNTER — ANESTHESIA (OUTPATIENT)
Dept: GASTROENTEROLOGY | Facility: HOSPITAL | Age: 35
End: 2025-01-03
Payer: COMMERCIAL

## 2025-01-03 ENCOUNTER — ANESTHESIA EVENT (OUTPATIENT)
Dept: GASTROENTEROLOGY | Facility: HOSPITAL | Age: 35
End: 2025-01-03
Payer: COMMERCIAL

## 2025-01-03 ENCOUNTER — HOSPITAL ENCOUNTER (OUTPATIENT)
Dept: GASTROENTEROLOGY | Facility: HOSPITAL | Age: 35
Setting detail: OUTPATIENT SURGERY
End: 2025-01-03
Payer: COMMERCIAL

## 2025-01-03 VITALS
OXYGEN SATURATION: 99 % | DIASTOLIC BLOOD PRESSURE: 80 MMHG | HEART RATE: 82 BPM | WEIGHT: 199.74 LBS | TEMPERATURE: 98.1 F | SYSTOLIC BLOOD PRESSURE: 123 MMHG | HEIGHT: 74 IN | RESPIRATION RATE: 17 BRPM | BODY MASS INDEX: 25.63 KG/M2

## 2025-01-03 DIAGNOSIS — R19.4 BOWEL HABIT CHANGES: ICD-10-CM

## 2025-01-03 DIAGNOSIS — Z86.0100 HISTORY OF COLONIC POLYPS: ICD-10-CM

## 2025-01-03 DIAGNOSIS — R10.32 LLQ PAIN: ICD-10-CM

## 2025-01-03 PROCEDURE — 45380 COLONOSCOPY AND BIOPSY: CPT | Performed by: INTERNAL MEDICINE

## 2025-01-03 PROCEDURE — 88305 TISSUE EXAM BY PATHOLOGIST: CPT | Performed by: PATHOLOGY

## 2025-01-03 RX ORDER — PROPOFOL 10 MG/ML
INJECTION, EMULSION INTRAVENOUS AS NEEDED
Status: DISCONTINUED | OUTPATIENT
Start: 2025-01-03 | End: 2025-01-03

## 2025-01-03 RX ORDER — ONDANSETRON 2 MG/ML
4 INJECTION INTRAMUSCULAR; INTRAVENOUS ONCE AS NEEDED
Status: CANCELLED | OUTPATIENT
Start: 2025-01-03

## 2025-01-03 RX ORDER — SODIUM CHLORIDE, SODIUM LACTATE, POTASSIUM CHLORIDE, CALCIUM CHLORIDE 600; 310; 30; 20 MG/100ML; MG/100ML; MG/100ML; MG/100ML
INJECTION, SOLUTION INTRAVENOUS CONTINUOUS PRN
Status: DISCONTINUED | OUTPATIENT
Start: 2025-01-03 | End: 2025-01-03

## 2025-01-03 RX ORDER — LIDOCAINE HYDROCHLORIDE 20 MG/ML
INJECTION, SOLUTION EPIDURAL; INFILTRATION; INTRACAUDAL; PERINEURAL AS NEEDED
Status: DISCONTINUED | OUTPATIENT
Start: 2025-01-03 | End: 2025-01-03

## 2025-01-03 RX ADMIN — PROPOFOL 50 MG: 10 INJECTION, EMULSION INTRAVENOUS at 12:44

## 2025-01-03 RX ADMIN — LIDOCAINE HYDROCHLORIDE 100 MG: 20 INJECTION, SOLUTION EPIDURAL; INFILTRATION; INTRACAUDAL; PERINEURAL at 12:35

## 2025-01-03 RX ADMIN — PROPOFOL 50 MG: 10 INJECTION, EMULSION INTRAVENOUS at 12:36

## 2025-01-03 RX ADMIN — PROPOFOL 150 MG: 10 INJECTION, EMULSION INTRAVENOUS at 12:35

## 2025-01-03 RX ADMIN — SODIUM CHLORIDE, SODIUM LACTATE, POTASSIUM CHLORIDE, AND CALCIUM CHLORIDE: .6; .31; .03; .02 INJECTION, SOLUTION INTRAVENOUS at 12:31

## 2025-01-03 RX ADMIN — PROPOFOL 100 MG: 10 INJECTION, EMULSION INTRAVENOUS at 12:37

## 2025-01-03 RX ADMIN — PROPOFOL 50 MG: 10 INJECTION, EMULSION INTRAVENOUS at 12:40

## 2025-01-03 NOTE — ANESTHESIA PREPROCEDURE EVALUATION
Procedure:  COLONOSCOPY    Relevant Problems   ANESTHESIA (within normal limits)      CARDIO (within normal limits)      ENDO (within normal limits)      NEURO/PSYCH   (+) Anxiety      PULMONARY (within normal limits)        Physical Exam    Airway    Mallampati score: I  TM Distance: >3 FB  Neck ROM: full     Dental   No notable dental hx     Cardiovascular  Cardiovascular exam normal    Pulmonary  Pulmonary exam normal     Other Findings        Anesthesia Plan  ASA Score- 1     Anesthesia Type- IV sedation with anesthesia with ASA Monitors.         Additional Monitors:     Airway Plan:            Plan Factors-Exercise tolerance (METS): >4 METS.    Chart reviewed.   Existing labs reviewed. Patient summary reviewed.    Patient is not a current smoker.              Induction-     Postoperative Plan-         Informed Consent- Anesthetic plan and risks discussed with patient.  I personally reviewed this patient with the CRNA. Discussed and agreed on the Anesthesia Plan with the CRNA..

## 2025-01-03 NOTE — ANESTHESIA POSTPROCEDURE EVALUATION
Post-Op Assessment Note    CV Status:  Stable    Pain management: adequate       Mental Status:  Alert and awake   Hydration Status:  Euvolemic   PONV Controlled:  Controlled   Airway Patency:  Patent  Two or more mitigation strategies used for obstructive sleep apnea   Post Op Vitals Reviewed: Yes    No anethesia notable event occurred.    Staff: Anesthesiologist, CRNA           Last Filed PACU Vitals:  Vitals Value Taken Time   Temp 97.5    Pulse 90    /70    Resp 19    SpO2 98

## 2025-01-07 DIAGNOSIS — R00.2 PALPITATIONS: ICD-10-CM

## 2025-01-07 DIAGNOSIS — F41.9 ANXIETY: ICD-10-CM

## 2025-01-07 DIAGNOSIS — R00.0 TACHYCARDIA: ICD-10-CM

## 2025-01-07 PROCEDURE — 88305 TISSUE EXAM BY PATHOLOGIST: CPT | Performed by: PATHOLOGY

## 2025-01-08 ENCOUNTER — RESULTS FOLLOW-UP (OUTPATIENT)
Dept: GASTROENTEROLOGY | Facility: CLINIC | Age: 35
End: 2025-01-08

## 2025-01-08 RX ORDER — METOPROLOL SUCCINATE 25 MG/1
12.5 TABLET, EXTENDED RELEASE ORAL DAILY
Qty: 45 TABLET | Refills: 1 | Status: SHIPPED | OUTPATIENT
Start: 2025-01-08

## 2025-01-08 RX ORDER — BUSPIRONE HYDROCHLORIDE 7.5 MG/1
7.5 TABLET ORAL 2 TIMES DAILY
Qty: 180 TABLET | Refills: 1 | Status: SHIPPED | OUTPATIENT
Start: 2025-01-08 | End: 2025-01-17

## 2025-01-17 ENCOUNTER — OFFICE VISIT (OUTPATIENT)
Dept: FAMILY MEDICINE CLINIC | Facility: CLINIC | Age: 35
End: 2025-01-17
Payer: COMMERCIAL

## 2025-01-17 VITALS
HEART RATE: 80 BPM | HEIGHT: 74 IN | WEIGHT: 205.8 LBS | OXYGEN SATURATION: 98 % | SYSTOLIC BLOOD PRESSURE: 132 MMHG | DIASTOLIC BLOOD PRESSURE: 88 MMHG | TEMPERATURE: 98.2 F | BODY MASS INDEX: 26.41 KG/M2

## 2025-01-17 DIAGNOSIS — N50.812 TESTICULAR PAIN, LEFT: ICD-10-CM

## 2025-01-17 DIAGNOSIS — F41.9 ANXIETY: Primary | ICD-10-CM

## 2025-01-17 PROCEDURE — 99214 OFFICE O/P EST MOD 30 MIN: CPT

## 2025-01-17 RX ORDER — BUSPIRONE HYDROCHLORIDE 10 MG/1
10 TABLET ORAL EVERY 12 HOURS
Qty: 60 TABLET | Refills: 0 | Status: SHIPPED | OUTPATIENT
Start: 2025-01-17

## 2025-01-17 NOTE — ASSESSMENT & PLAN NOTE
Improvement with buspar, however reports recent life stressors increasing daily anxiety   Therefore will increase current buspar to 10 mg BID; tolerating medication well with no side effects at this time  Will continue to follow with talk therapist which I feel is beneficial   No SI -- advised ER if he did experience this which he is agreeable with   Continue Toprol for anxiety associated palpitations which has been helping as well  Follow up in four weeks for medication check, to call sooner with any questions/concerns    Orders:    busPIRone (BUSPAR) 10 mg tablet; Take 1 tablet (10 mg total) by mouth every 12 (twelve) hours

## 2025-01-17 NOTE — PROGRESS NOTES
Name: Tai Chance      : 1990      MRN: [id number]  Encounter Provider: Rosey Mckeon PA-C  Encounter Date: 2025   Encounter department: [facility]  :  Assessment & Plan  Anxiety  Improvement with buspar, however reports recent life stressors increasing daily anxiety   Therefore will increase current buspar to 10 mg BID; tolerating medication well with no side effects at this time  Will continue to follow with talk therapist which I feel is beneficial   No SI -- advised ER if he did experience this which he is agreeable with   Continue Toprol for anxiety associated palpitations which has been helping as well  Follow up in four weeks for medication check, to call sooner with any questions/concerns    Orders:    busPIRone (BUSPAR) 10 mg tablet; Take 1 tablet (10 mg total) by mouth every 12 (twelve) hours    Testicular pain, left  Reports left testicular pain x 1 month, no associated skin changes, urinary issues or masses per patient  Defers  exam   Therefore, ordered urgent US of testicles and scrotum for further evaluation   Advised ER for worsening pain, decreased urination, painful urination, abdominal pain, n/v -- patient agreeable  To call with any questions or concerns    Orders:    US scrotum and testicles; Future           History of Present Illness     CC: anxiety follow up     Patient presents for anxiety follow up.   Currently on buspar 7.5 mg BID which he feels like does help his anxiety a lot -- however with recent stressors in his life including his wife's health, he reports anxiety has gotten worse again and he is interested in possibly increasing medication. Reports no SI.   Reports toprol continues to help with the palpitations. Only experiences rarely now when he is under extreme anxiety. He does have a follow up with cardiology next month.     He had colonoscopy since last visit -- multiple polyps, however per GI Dr Morton's message on  all were benign and  "he recommends repeat colonoscopy in 5 years. Patient repots abdominal pain has resolved, he is eating more again and gaining weight back. He feels well.     Reports intermittent \"pulling sensation\" in left testicle x 1 month. Denies any lumps, bumps or skin changes. Denies any issues urinating -- no dysuria, frequency, urgency or difficulty urinating.     Needs me to fill out work forms for absences from 1/2-1/17/25; reports he had to take off for colonoscopy and increased anxiety surrounding procedure. Is returning to work today.       Review of Systems   Constitutional:  Negative for chills, diaphoresis and fever.   Respiratory:  Negative for cough, chest tightness, shortness of breath and wheezing.    Cardiovascular:  Positive for palpitations (improving). Negative for chest pain.   Gastrointestinal:  Negative for abdominal pain, blood in stool, constipation, diarrhea, nausea and vomiting.   Genitourinary:  Positive for testicular pain. Negative for decreased urine volume, difficulty urinating, dysuria, flank pain, frequency, hematuria, penile discharge, penile swelling and scrotal swelling.   Neurological:  Negative for dizziness, light-headedness and headaches.   Psychiatric/Behavioral:  Negative for self-injury and suicidal ideas. The patient is nervous/anxious.        Objective   /88   Pulse 80   Temp 98.2 °F (36.8 °C)   Ht 6' 2\" (1.88 m)   Wt 93.4 kg (205 lb 12.8 oz)   SpO2 98%   BMI 26.42 kg/m²      Physical Exam  Vitals reviewed.   Constitutional:       General: He is not in acute distress.     Appearance: Normal appearance. He is not ill-appearing or diaphoretic.   HENT:      Head: Normocephalic and atraumatic.      Right Ear: External ear normal.      Left Ear: External ear normal.      Nose: Nose normal.      Mouth/Throat:      Mouth: Mucous membranes are moist.   Eyes:      General:         Right eye: No discharge.         Left eye: No discharge.      Conjunctiva/sclera: Conjunctivae " normal.   Cardiovascular:      Rate and Rhythm: Normal rate and regular rhythm.      Pulses: Normal pulses.      Heart sounds: Normal heart sounds. No murmur heard.  Pulmonary:      Effort: Pulmonary effort is normal. No respiratory distress.      Breath sounds: Normal breath sounds. No wheezing, rhonchi or rales.   Abdominal:      Tenderness: There is no right CVA tenderness or left CVA tenderness.   Genitourinary:     Comments: defers  Musculoskeletal:         General: Normal range of motion.      Cervical back: Normal range of motion.      Right lower leg: No edema.      Left lower leg: No edema.   Skin:     General: Skin is warm.   Neurological:      General: No focal deficit present.      Mental Status: He is alert.      Gait: Gait normal.   Psychiatric:         Mood and Affect: Mood normal.

## 2025-01-21 ENCOUNTER — HOSPITAL ENCOUNTER (OUTPATIENT)
Dept: ULTRASOUND IMAGING | Facility: HOSPITAL | Age: 35
Discharge: HOME | End: 2025-01-21
Payer: COMMERCIAL

## 2025-01-21 DIAGNOSIS — N50.812 TESTICULAR PAIN, LEFT: ICD-10-CM

## 2025-01-21 PROCEDURE — 76870 US EXAM SCROTUM: CPT

## 2025-01-24 ENCOUNTER — RESULTS FOLLOW-UP (OUTPATIENT)
Dept: FAMILY MEDICINE CLINIC | Facility: CLINIC | Age: 35
End: 2025-01-24

## 2025-01-27 ENCOUNTER — APPOINTMENT (EMERGENCY)
Dept: CT IMAGING | Facility: HOSPITAL | Age: 35
End: 2025-01-27
Payer: COMMERCIAL

## 2025-01-27 ENCOUNTER — HOSPITAL ENCOUNTER (EMERGENCY)
Facility: HOSPITAL | Age: 35
Discharge: HOME | End: 2025-01-27
Attending: EMERGENCY MEDICINE
Payer: COMMERCIAL

## 2025-01-27 VITALS
SYSTOLIC BLOOD PRESSURE: 138 MMHG | RESPIRATION RATE: 18 BRPM | BODY MASS INDEX: 26.88 KG/M2 | WEIGHT: 209.44 LBS | HEART RATE: 85 BPM | DIASTOLIC BLOOD PRESSURE: 65 MMHG | HEIGHT: 74 IN | OXYGEN SATURATION: 98 % | TEMPERATURE: 98.7 F

## 2025-01-27 DIAGNOSIS — M79.622 LEFT UPPER ARM PAIN: ICD-10-CM

## 2025-01-27 DIAGNOSIS — R51.9 ACUTE HEADACHE: Primary | ICD-10-CM

## 2025-01-27 DIAGNOSIS — R20.0 LEFT LEG NUMBNESS: ICD-10-CM

## 2025-01-27 DIAGNOSIS — R20.0 LEFT ARM NUMBNESS: ICD-10-CM

## 2025-01-27 LAB
ANION GAP SERPL CALCULATED.3IONS-SCNC: 8 MMOL/L (ref 4–13)
ATRIAL RATE: 96 BPM
BASOPHILS # BLD AUTO: 0.01 THOUSANDS/ΜL (ref 0–0.1)
BASOPHILS NFR BLD AUTO: 0 % (ref 0–1)
BUN SERPL-MCNC: 15 MG/DL (ref 5–25)
CALCIUM SERPL-MCNC: 9.5 MG/DL (ref 8.4–10.2)
CARDIAC TROPONIN I PNL SERPL HS: <2 NG/L (ref ?–50)
CHLORIDE SERPL-SCNC: 104 MMOL/L (ref 96–108)
CO2 SERPL-SCNC: 25 MMOL/L (ref 21–32)
CREAT SERPL-MCNC: 1.21 MG/DL (ref 0.6–1.3)
EOSINOPHIL # BLD AUTO: 0.02 THOUSAND/ΜL (ref 0–0.61)
EOSINOPHIL NFR BLD AUTO: 1 % (ref 0–6)
ERYTHROCYTE [DISTWIDTH] IN BLOOD BY AUTOMATED COUNT: 12.5 % (ref 11.6–15.1)
GFR SERPL CREATININE-BSD FRML MDRD: 76 ML/MIN/1.73SQ M
GLUCOSE SERPL-MCNC: 128 MG/DL (ref 65–140)
HCT VFR BLD AUTO: 42.3 % (ref 36.5–49.3)
HGB BLD-MCNC: 13.9 G/DL (ref 12–17)
IMM GRANULOCYTES # BLD AUTO: 0.01 THOUSAND/UL (ref 0–0.2)
IMM GRANULOCYTES NFR BLD AUTO: 0 % (ref 0–2)
LYMPHOCYTES # BLD AUTO: 1.41 THOUSANDS/ΜL (ref 0.6–4.47)
LYMPHOCYTES NFR BLD AUTO: 42 % (ref 14–44)
MCH RBC QN AUTO: 28.1 PG (ref 26.8–34.3)
MCHC RBC AUTO-ENTMCNC: 32.9 G/DL (ref 31.4–37.4)
MCV RBC AUTO: 86 FL (ref 82–98)
MONOCYTES # BLD AUTO: 0.35 THOUSAND/ΜL (ref 0.17–1.22)
MONOCYTES NFR BLD AUTO: 10 % (ref 4–12)
NEUTROPHILS # BLD AUTO: 1.57 THOUSANDS/ΜL (ref 1.85–7.62)
NEUTS SEG NFR BLD AUTO: 47 % (ref 43–75)
NRBC BLD AUTO-RTO: 0 /100 WBCS
P AXIS: 56 DEGREES
PLATELET # BLD AUTO: 249 THOUSANDS/UL (ref 149–390)
PMV BLD AUTO: 8.6 FL (ref 8.9–12.7)
POTASSIUM SERPL-SCNC: 3.9 MMOL/L (ref 3.5–5.3)
PR INTERVAL: 144 MS
QRS AXIS: 71 DEGREES
QRSD INTERVAL: 88 MS
QT INTERVAL: 332 MS
QTC INTERVAL: 419 MS
RBC # BLD AUTO: 4.94 MILLION/UL (ref 3.88–5.62)
SODIUM SERPL-SCNC: 137 MMOL/L (ref 135–147)
T WAVE AXIS: 51 DEGREES
VENTRICULAR RATE: 96 BPM
WBC # BLD AUTO: 3.37 THOUSAND/UL (ref 4.31–10.16)

## 2025-01-27 PROCEDURE — 96374 THER/PROPH/DIAG INJ IV PUSH: CPT

## 2025-01-27 PROCEDURE — 85025 COMPLETE CBC W/AUTO DIFF WBC: CPT | Performed by: EMERGENCY MEDICINE

## 2025-01-27 PROCEDURE — 99284 EMERGENCY DEPT VISIT MOD MDM: CPT

## 2025-01-27 PROCEDURE — 96361 HYDRATE IV INFUSION ADD-ON: CPT

## 2025-01-27 PROCEDURE — 36415 COLL VENOUS BLD VENIPUNCTURE: CPT | Performed by: EMERGENCY MEDICINE

## 2025-01-27 PROCEDURE — 93005 ELECTROCARDIOGRAM TRACING: CPT

## 2025-01-27 PROCEDURE — 96375 TX/PRO/DX INJ NEW DRUG ADDON: CPT

## 2025-01-27 PROCEDURE — 70450 CT HEAD/BRAIN W/O DYE: CPT

## 2025-01-27 PROCEDURE — 93010 ELECTROCARDIOGRAM REPORT: CPT | Performed by: INTERNAL MEDICINE

## 2025-01-27 PROCEDURE — 84484 ASSAY OF TROPONIN QUANT: CPT | Performed by: EMERGENCY MEDICINE

## 2025-01-27 PROCEDURE — 99285 EMERGENCY DEPT VISIT HI MDM: CPT | Performed by: EMERGENCY MEDICINE

## 2025-01-27 PROCEDURE — 80048 BASIC METABOLIC PNL TOTAL CA: CPT | Performed by: EMERGENCY MEDICINE

## 2025-01-27 PROCEDURE — 70498 CT ANGIOGRAPHY NECK: CPT

## 2025-01-27 PROCEDURE — 70496 CT ANGIOGRAPHY HEAD: CPT

## 2025-01-27 RX ORDER — ACETAMINOPHEN 325 MG/1
975 TABLET ORAL ONCE
Status: COMPLETED | OUTPATIENT
Start: 2025-01-27 | End: 2025-01-27

## 2025-01-27 RX ORDER — METHYLPREDNISOLONE SODIUM SUCCINATE 125 MG/2ML
62.5 INJECTION, POWDER, LYOPHILIZED, FOR SOLUTION INTRAMUSCULAR; INTRAVENOUS ONCE
Status: COMPLETED | OUTPATIENT
Start: 2025-01-27 | End: 2025-01-27

## 2025-01-27 RX ORDER — METOCLOPRAMIDE HYDROCHLORIDE 5 MG/ML
10 INJECTION INTRAMUSCULAR; INTRAVENOUS ONCE
Status: COMPLETED | OUTPATIENT
Start: 2025-01-27 | End: 2025-01-27

## 2025-01-27 RX ORDER — KETOROLAC TROMETHAMINE 30 MG/ML
15 INJECTION, SOLUTION INTRAMUSCULAR; INTRAVENOUS ONCE
Status: COMPLETED | OUTPATIENT
Start: 2025-01-27 | End: 2025-01-27

## 2025-01-27 RX ADMIN — SODIUM CHLORIDE 1000 ML: 0.9 INJECTION, SOLUTION INTRAVENOUS at 12:28

## 2025-01-27 RX ADMIN — ACETAMINOPHEN 975 MG: 325 TABLET, FILM COATED ORAL at 12:26

## 2025-01-27 RX ADMIN — KETOROLAC TROMETHAMINE 15 MG: 30 INJECTION, SOLUTION INTRAMUSCULAR at 12:18

## 2025-01-27 RX ADMIN — METHYLPREDNISOLONE SODIUM SUCCINATE 62.5 MG: 125 INJECTION, POWDER, FOR SOLUTION INTRAMUSCULAR; INTRAVENOUS at 12:20

## 2025-01-27 RX ADMIN — IOHEXOL 85 ML: 350 INJECTION, SOLUTION INTRAVENOUS at 14:19

## 2025-01-27 RX ADMIN — METOCLOPRAMIDE 10 MG: 5 INJECTION, SOLUTION INTRAMUSCULAR; INTRAVENOUS at 12:23

## 2025-01-27 NOTE — ED PROVIDER NOTES
Time reflects when diagnosis was documented in both MDM as applicable and the Disposition within this note       Time User Action Codes Description Comment    1/27/2025 12:11 PM Gin Boatengn Add [R51.9] Acute headache     1/27/2025 12:11 PM TobyeGinn Add [M79.622] Left upper arm pain     1/27/2025 12:11 PM Erne, Flo Add [R20.0] Left arm numbness     1/27/2025 12:11 PM Erne, Flo Add [R20.0] Left leg numbness           ED Disposition       ED Disposition   Discharge    Condition   Stable    Date/Time   Mon Jan 27, 2025  2:53 PM    Comment   Tai DILCIA Chance discharge to home/self care.                   Assessment & Plan       Medical Decision Making  34-year-old male no significant reported past history presenting with headache and left arm and leg numbness.  Plan for CT imaging.  Basic labs and cardiac evaluation including EKG troponin.  Symptom management with oral and IV medications plus fluids.  Reassess.    EKG interpreted by me with normal sinus rhythm and no acute ST abnormality.  Labs interpreted by me without significant acute process.  Symptoms resolved after medications.  Initial noncontrast CT head with possible abnormal vertebral artery.  CTA with normal variant.  Symptoms remained resolved. Discussed results and recommendations. Advised follow up PCP. Medication recommendations. Given instructions and return precautions. Patient/family at bedside acknowledged understanding of all written and verbal instructions and return precautions. Discharged.     Amount and/or Complexity of Data Reviewed  Labs: ordered.  Radiology: ordered.    Risk  OTC drugs.  Prescription drug management.             Medications   sodium chloride 0.9 % bolus 1,000 mL (0 mL Intravenous Stopped 1/27/25 1409)   acetaminophen (TYLENOL) tablet 975 mg (975 mg Oral Given 1/27/25 1226)   ketorolac (TORADOL) injection 15 mg (15 mg Intravenous Given 1/27/25 1218)   metoclopramide (REGLAN) injection 10 mg (10 mg Intravenous Given 1/27/25  1223)   methylPREDNISolone sodium succinate (Solu-MEDROL) injection 62.5 mg (62.5 mg Intravenous Given 1/27/25 1220)   iohexol (OMNIPAQUE) 350 MG/ML injection (MULTI-DOSE) 85 mL (85 mL Intravenous Given 1/27/25 1419)       ED Risk Strat Scores      HEART Risk Score      Flowsheet Row Most Recent Value   Heart Score Risk Calculator    History 0 Filed at: 01/27/2025 1503   ECG 0 Filed at: 01/27/2025 1503   Age 0 Filed at: 01/27/2025 1503   Risk Factors 0 Filed at: 01/27/2025 1503   Troponin 0 Filed at: 01/27/2025 1503   HEART Score 0 Filed at: 01/27/2025 1503                            SBIRT 22yo+      Flowsheet Row Most Recent Value   Initial Alcohol Screen: US AUDIT-C     1. How often do you have a drink containing alcohol? 1 Filed at: 01/27/2025 1158   2. How many drinks containing alcohol do you have on a typical day you are drinking?  1 Filed at: 01/27/2025 1158   3a. Male UNDER 65: How often do you have five or more drinks on one occasion? 0 Filed at: 01/27/2025 1158   3b. FEMALE Any Age, or MALE 65+: How often do you have 4 or more drinks on one occassion? 0 Filed at: 01/27/2025 1158   Audit-C Score 2 Filed at: 01/27/2025 1155   JEMMA: How many times in the past year have you...    Used an illegal drug or used a prescription medication for non-medical reasons? Never Filed at: 01/27/2025 1154                            History of Present Illness       Chief Complaint   Patient presents with    Numbness     Patient BIBA c/o having Left LE numbness/tingling - started approx 45 mins ago while at work.     Headache     Started about 2 days ago, back of head per patient.       Past Medical History:   Diagnosis Date    Murmur, cardiac       Past Surgical History:   Procedure Laterality Date    COLONOSCOPY        Family History   Problem Relation Age of Onset    Thyroid cancer Mother     Heart attack Father     Diabetes Maternal Grandmother       Social History     Tobacco Use    Smoking status: Never     Passive  exposure: Never    Smokeless tobacco: Never   Vaping Use    Vaping status: Never Used   Substance Use Topics    Alcohol use: Yes     Comment: occasionally    Drug use: Yes     Frequency: 7.0 times per week     Types: Marijuana     Comment: once a day      E-Cigarette/Vaping    E-Cigarette Use Never User       E-Cigarette/Vaping Substances      I have reviewed and agree with the history as documented.     34-year-old male no significant reported past history presenting with headache and left arm and leg numbness.  Patient reports recently had flulike symptoms which are mostly resolved.  Began with mild left posterior headache which has been intermittent over the last couple of days.  Headache not maximal in onset not the worst headache of his life.  Patient reports acute onset of numbness to left arm and leg today.  Reports numbness primarily to left lateral arm and medial forearm as well as posterior left calf.  Reports some tenderness left lateral arm and calf.  Denies any known injury.  Denies any associated motor deficits.  Denies any chest pain shortness of breath.  Does report some palpitations when the numbness occurred.  Reports he just went up and down the stairs a few times prior to the onset of numbness.  Denies any other complaints.  Chart reviewed.    Past Medical History:  No date: Murmur, cardiac  Family History: non-contributory  Social History          Review of Systems   Constitutional:  Negative for appetite change, chills, diaphoresis, fever and unexpected weight change.   HENT:  Negative for congestion and rhinorrhea.    Eyes:  Negative for photophobia and visual disturbance.   Respiratory:  Negative for cough, chest tightness and shortness of breath.    Cardiovascular:  Positive for palpitations. Negative for chest pain and leg swelling.   Gastrointestinal:  Negative for abdominal distention, abdominal pain, blood in stool, constipation, diarrhea, nausea and vomiting.   Genitourinary:  Negative  for dysuria and hematuria.   Musculoskeletal:  Negative for back pain, joint swelling, neck pain and neck stiffness.   Skin:  Negative for color change, pallor, rash and wound.   Neurological:  Positive for numbness and headaches. Negative for dizziness, syncope, weakness and light-headedness.   Psychiatric/Behavioral:  Negative for agitation.    All other systems reviewed and are negative.          Objective       ED Triage Vitals   Temperature Pulse Blood Pressure Respirations SpO2 Patient Position - Orthostatic VS   01/27/25 1156 01/27/25 1156 01/27/25 1156 01/27/25 1156 01/27/25 1156 01/27/25 1156   98.7 °F (37.1 °C) 99 129/75 18 98 % Lying      Temp Source Heart Rate Source BP Location FiO2 (%) Pain Score    01/27/25 1156 01/27/25 1330 01/27/25 1156 -- 01/27/25 1218    Oral Monitor Right arm  4      Vitals      Date and Time Temp Pulse SpO2 Resp BP Pain Score FACES Pain Rating User   01/27/25 1445 -- 85 98 % 18 138/65 -- -- TS   01/27/25 1430 -- 82 97 % 13 138/65 -- -- TS   01/27/25 1330 -- 73 97 % 19 123/59 -- -- TS   01/27/25 1226 -- -- -- -- -- 4 -- TS   01/27/25 1218 -- -- -- -- -- 4 -- TS   01/27/25 1156 98.7 °F (37.1 °C) 99 98 % 18 129/75 -- -- TS            Physical Exam  Vitals and nursing note reviewed.   Constitutional:       General: He is not in acute distress.     Appearance: Normal appearance. He is well-developed. He is not ill-appearing, toxic-appearing or diaphoretic.   HENT:      Head: Normocephalic and atraumatic.      Nose: Nose normal. No congestion or rhinorrhea.      Mouth/Throat:      Mouth: Mucous membranes are moist.      Pharynx: Oropharynx is clear. No oropharyngeal exudate or posterior oropharyngeal erythema.   Eyes:      General: No scleral icterus.        Right eye: No discharge.         Left eye: No discharge.      Extraocular Movements: Extraocular movements intact.      Conjunctiva/sclera: Conjunctivae normal.      Pupils: Pupils are equal, round, and reactive to light.   Neck:       Vascular: No JVD.      Trachea: No tracheal deviation.      Comments: Supple. Normal range of motion.   Cardiovascular:      Rate and Rhythm: Normal rate and regular rhythm.      Heart sounds: Normal heart sounds. No murmur heard.     No friction rub. No gallop.      Comments: Normal rate and regular rhythm  Pulmonary:      Effort: Pulmonary effort is normal. No respiratory distress.      Breath sounds: Normal breath sounds. No stridor. No wheezing or rales.      Comments: Clear to auscultation bilaterally  Chest:      Chest wall: No tenderness.   Abdominal:      General: Bowel sounds are normal. There is no distension.      Palpations: Abdomen is soft.      Tenderness: There is no abdominal tenderness. There is no right CVA tenderness, left CVA tenderness, guarding or rebound.      Comments: Soft, nontender, nondistended.  Normal bowel sounds throughout   Musculoskeletal:         General: Tenderness present. No swelling, deformity or signs of injury. Normal range of motion.      Cervical back: Normal range of motion and neck supple. No rigidity. No muscular tenderness.      Right lower leg: No edema.      Left lower leg: No edema.      Comments: 2+ radial and DP PT pulses bilaterally.  Mild tenderness left lateral upper arm and left posterior calf   Lymphadenopathy:      Cervical: No cervical adenopathy.   Skin:     General: Skin is warm and dry.      Coloration: Skin is not pale.      Findings: No erythema or rash.   Neurological:      General: No focal deficit present.      Mental Status: He is alert and oriented to person, place, and time. Mental status is at baseline.      Cranial Nerves: No cranial nerve deficit.      Sensory: Sensory deficit present.      Motor: No weakness or abnormal muscle tone.      Coordination: Coordination normal.      Gait: Gait normal.      Comments: A&Ox3 to person, place, and time. CN 2-12 intact. Strength 5/5 throughout.  Sensation intact entire left upper extremity.  Mildly  decreased sensation to posterior left calf.  Sensation otherwise intact throughout. Cerebellar exam including gait intact.   Psychiatric:         Behavior: Behavior normal.         Thought Content: Thought content normal.         Results Reviewed       Procedure Component Value Units Date/Time    HS Troponin 0hr (reflex protocol) [[id number]]  (Normal) Collected: 01/27/25 1213    Lab Status: Final result Specimen: Blood from Arm, Left Updated: 01/27/25 1239     hs TnI 0hr <2 ng/L     Basic metabolic panel [[id number]] Collected: 01/27/25 1213    Lab Status: Final result Specimen: Blood from Arm, Left Updated: 01/27/25 1231     Sodium 137 mmol/L      Potassium 3.9 mmol/L      Chloride 104 mmol/L      CO2 25 mmol/L      ANION GAP 8 mmol/L      BUN 15 mg/dL      Creatinine 1.21 mg/dL      Glucose 128 mg/dL      Calcium 9.5 mg/dL      eGFR 76 ml/min/1.73sq m     Narrative:      [employer] guidelines for Chronic Kidney Disease (CKD):     Stage 1 with normal or high GFR (GFR > 90 mL/min/1.73 square meters)    Stage 2 Mild CKD (GFR = 60-89 mL/min/1.73 square meters)    Stage 3A Moderate CKD (GFR = 45-59 mL/min/1.73 square meters)    Stage 3B Moderate CKD (GFR = 30-44 mL/min/1.73 square meters)    Stage 4 Severe CKD (GFR = 15-29 mL/min/1.73 square meters)    Stage 5 End Stage CKD (GFR <15 mL/min/1.73 square meters)  Note: GFR calculation is accurate only with a steady state creatinine    CBC and differential [[id number]]  (Abnormal) Collected: 01/27/25 1213    Lab Status: Final result Specimen: Blood from Arm, Left Updated: 01/27/25 1218     WBC 3.37 Thousand/uL      RBC 4.94 Million/uL      Hemoglobin 13.9 g/dL      Hematocrit 42.3 %      MCV 86 fL      MCH 28.1 pg      MCHC 32.9 g/dL      RDW 12.5 %      MPV 8.6 fL      Platelets 249 Thousands/uL      nRBC 0 /100 WBCs      Segmented % 47 %      Immature Grans % 0 %      Lymphocytes % 42 %      Monocytes % 10 %      Eosinophils Relative 1 %       Basophils Relative 0 %      Absolute Neutrophils 1.57 Thousands/µL      Absolute Immature Grans 0.01 Thousand/uL      Absolute Lymphocytes 1.41 Thousands/µL      Absolute Monocytes 0.35 Thousand/µL      Eosinophils Absolute 0.02 Thousand/µL      Basophils Absolute 0.01 Thousands/µL             CTA head and neck with and without contrast   Final Interpretation by Werner Pina MD (01/27 [id number])      Normal CTA of the head and neck. 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      The study was marked in EPIC for immediate notification.            Workstation performed: [id number]         CT head without contrast   Final Interpretation by Jose Francisco Gilmore MD (01/27 7944)      Large, hyperdense left vertebral artery compared to the right. Although this may be related to artifact and/or a hypoplastic right vertebral artery, CTA is recommended for further evaluation.      I personally discussed this study with CELE FERREIRA on 1/27/2025 1:39 PM.         Workstation performed: [id number]             Procedures    ED Medication and Procedure Management   Prior to Admission Medications   Prescriptions Last Dose Informant Patient Reported? Taking?   busPIRone (BUSPAR) 10 mg tablet   No No   Sig: Take 1 tablet (10 mg total) by mouth every 12 (twelve) hours   metoprolol succinate (TOPROL-XL) 25 mg 24 hr tablet   No No   Sig: TAKE 1/2 TABLET BY MOUTH EVERY DAY   ondansetron (ZOFRAN-ODT) 4 mg disintegrating tablet  Self No No   Sig: Take 1 tablet (4 mg total) by mouth every 6 (six) hours as needed for nausea or vomiting      Facility-Administered Medications: None     Discharge Medication List as of 1/27/2025  2:54 PM        CONTINUE these medications which have NOT CHANGED    Details   busPIRone (BUSPAR) 10 mg tablet Take 1 tablet (10 mg total)  by mouth every 12 (twelve) hours, Starting Fri 1/17/2025, Normal      metoprolol succinate (TOPROL-XL) 25 mg 24 hr tablet TAKE 1/2 TABLET BY MOUTH EVERY DAY, Starting Wed 1/8/2025, Normal      ondansetron (ZOFRAN-ODT) 4 mg disintegrating tablet Take 1 tablet (4 mg total) by mouth every 6 (six) hours as needed for nausea or vomiting, Starting Sun 12/29/2024, Normal           No discharge procedures on file.  ED SEPSIS DOCUMENTATION   Time reflects when diagnosis was documented in both MDM as applicable and the Disposition within this note       Time User Action Codes Description Comment    1/27/2025 12:11 PM Flo Boateng Add [R51.9] Acute headache     1/27/2025 12:11 PM Flo Boateng [M79.622] Left upper arm pain     1/27/2025 12:11 PM Flo Boateng Add [R20.0] Left arm numbness     1/27/2025 12:11 PM Flo Boateng Add [R20.0] Left leg numbness                  Flo Boateng MD  01/27/25 0687

## 2025-01-28 ENCOUNTER — HOSPITAL ENCOUNTER (EMERGENCY)
Facility: HOSPITAL | Age: 35
Discharge: HOME | End: 2025-01-29
Attending: EMERGENCY MEDICINE | Admitting: EMERGENCY MEDICINE
Payer: COMMERCIAL

## 2025-01-28 ENCOUNTER — APPOINTMENT (EMERGENCY)
Dept: RADIOLOGY | Facility: HOSPITAL | Age: 35
End: 2025-01-28
Payer: COMMERCIAL

## 2025-01-28 DIAGNOSIS — J10.1 INFLUENZA A: Primary | ICD-10-CM

## 2025-01-28 LAB
ALBUMIN SERPL BCG-MCNC: 4.3 G/DL (ref 3.5–5)
ALP SERPL-CCNC: 45 U/L (ref 34–104)
ALT SERPL W P-5'-P-CCNC: 23 U/L (ref 7–52)
ANION GAP SERPL CALCULATED.3IONS-SCNC: 9 MMOL/L (ref 4–13)
APTT PPP: 27 SECONDS (ref 23–34)
AST SERPL W P-5'-P-CCNC: 17 U/L (ref 13–39)
BASOPHILS # BLD AUTO: 0.02 THOUSANDS/ΜL (ref 0–0.1)
BASOPHILS NFR BLD AUTO: 0 % (ref 0–1)
BILIRUB SERPL-MCNC: 0.36 MG/DL (ref 0.2–1)
BUN SERPL-MCNC: 17 MG/DL (ref 5–25)
CALCIUM SERPL-MCNC: 9.1 MG/DL (ref 8.4–10.2)
CHLORIDE SERPL-SCNC: 106 MMOL/L (ref 96–108)
CO2 SERPL-SCNC: 24 MMOL/L (ref 21–32)
CREAT SERPL-MCNC: 1.11 MG/DL (ref 0.6–1.3)
D DIMER PPP FEU-MCNC: 0.28 UG/ML FEU
EOSINOPHIL # BLD AUTO: 0 THOUSAND/ΜL (ref 0–0.61)
EOSINOPHIL NFR BLD AUTO: 0 % (ref 0–6)
ERYTHROCYTE [DISTWIDTH] IN BLOOD BY AUTOMATED COUNT: 12.4 % (ref 11.6–15.1)
GFR SERPL CREATININE-BSD FRML MDRD: 84 ML/MIN/1.73SQ M
GLUCOSE SERPL-MCNC: 106 MG/DL (ref 65–140)
HCT VFR BLD AUTO: 41.5 % (ref 36.5–49.3)
HGB BLD-MCNC: 13.6 G/DL (ref 12–17)
IMM GRANULOCYTES # BLD AUTO: 0.03 THOUSAND/UL (ref 0–0.2)
IMM GRANULOCYTES NFR BLD AUTO: 0 % (ref 0–2)
INR PPP: 0.94 (ref 0.85–1.19)
LYMPHOCYTES # BLD AUTO: 1.97 THOUSANDS/ΜL (ref 0.6–4.47)
LYMPHOCYTES NFR BLD AUTO: 25 % (ref 14–44)
MCH RBC QN AUTO: 28.6 PG (ref 26.8–34.3)
MCHC RBC AUTO-ENTMCNC: 32.8 G/DL (ref 31.4–37.4)
MCV RBC AUTO: 87 FL (ref 82–98)
MONOCYTES # BLD AUTO: 0.44 THOUSAND/ΜL (ref 0.17–1.22)
MONOCYTES NFR BLD AUTO: 6 % (ref 4–12)
NEUTROPHILS # BLD AUTO: 5.35 THOUSANDS/ΜL (ref 1.85–7.62)
NEUTS SEG NFR BLD AUTO: 69 % (ref 43–75)
NRBC BLD AUTO-RTO: 0 /100 WBCS
PLATELET # BLD AUTO: 289 THOUSANDS/UL (ref 149–390)
PMV BLD AUTO: 8.8 FL (ref 8.9–12.7)
POTASSIUM SERPL-SCNC: 3.5 MMOL/L (ref 3.5–5.3)
PROT SERPL-MCNC: 7.4 G/DL (ref 6.4–8.4)
PROTHROMBIN TIME: 13.1 SECONDS (ref 12.3–15)
RBC # BLD AUTO: 4.75 MILLION/UL (ref 3.88–5.62)
SODIUM SERPL-SCNC: 139 MMOL/L (ref 135–147)
WBC # BLD AUTO: 7.81 THOUSAND/UL (ref 4.31–10.16)

## 2025-01-28 PROCEDURE — 84484 ASSAY OF TROPONIN QUANT: CPT | Performed by: EMERGENCY MEDICINE

## 2025-01-28 PROCEDURE — 36415 COLL VENOUS BLD VENIPUNCTURE: CPT | Performed by: EMERGENCY MEDICINE

## 2025-01-28 PROCEDURE — 85610 PROTHROMBIN TIME: CPT | Performed by: EMERGENCY MEDICINE

## 2025-01-28 PROCEDURE — 93005 ELECTROCARDIOGRAM TRACING: CPT

## 2025-01-28 PROCEDURE — 99285 EMERGENCY DEPT VISIT HI MDM: CPT

## 2025-01-28 PROCEDURE — 80053 COMPREHEN METABOLIC PANEL: CPT | Performed by: EMERGENCY MEDICINE

## 2025-01-28 PROCEDURE — 85025 COMPLETE CBC W/AUTO DIFF WBC: CPT | Performed by: EMERGENCY MEDICINE

## 2025-01-28 PROCEDURE — 85730 THROMBOPLASTIN TIME PARTIAL: CPT | Performed by: EMERGENCY MEDICINE

## 2025-01-28 PROCEDURE — 71045 X-RAY EXAM CHEST 1 VIEW: CPT

## 2025-01-28 PROCEDURE — 85379 FIBRIN DEGRADATION QUANT: CPT | Performed by: EMERGENCY MEDICINE

## 2025-01-28 PROCEDURE — 0241U HB NFCT DS VIR RESP RNA 4 TRGT: CPT | Performed by: EMERGENCY MEDICINE

## 2025-01-28 PROCEDURE — 99285 EMERGENCY DEPT VISIT HI MDM: CPT | Performed by: EMERGENCY MEDICINE

## 2025-01-29 ENCOUNTER — OFFICE VISIT (OUTPATIENT)
Dept: FAMILY MEDICINE CLINIC | Facility: CLINIC | Age: 35
End: 2025-01-29
Payer: COMMERCIAL

## 2025-01-29 ENCOUNTER — APPOINTMENT (OUTPATIENT)
Dept: LAB | Facility: CLINIC | Age: 35
End: 2025-01-29
Payer: COMMERCIAL

## 2025-01-29 VITALS
SYSTOLIC BLOOD PRESSURE: 130 MMHG | BODY MASS INDEX: 25.95 KG/M2 | WEIGHT: 202.2 LBS | OXYGEN SATURATION: 97 % | HEART RATE: 83 BPM | TEMPERATURE: 98 F | HEIGHT: 74 IN | DIASTOLIC BLOOD PRESSURE: 82 MMHG

## 2025-01-29 VITALS
DIASTOLIC BLOOD PRESSURE: 78 MMHG | TEMPERATURE: 98.2 F | OXYGEN SATURATION: 97 % | RESPIRATION RATE: 18 BRPM | SYSTOLIC BLOOD PRESSURE: 134 MMHG | HEART RATE: 85 BPM

## 2025-01-29 DIAGNOSIS — F41.9 ANXIETY: ICD-10-CM

## 2025-01-29 DIAGNOSIS — R00.2 PALPITATIONS: ICD-10-CM

## 2025-01-29 DIAGNOSIS — J10.1 INFLUENZA A: Primary | ICD-10-CM

## 2025-01-29 DIAGNOSIS — Z13.220 LIPID SCREENING: ICD-10-CM

## 2025-01-29 DIAGNOSIS — R93.89 ABNORMAL COMPUTED TOMOGRAPHY ANGIOGRAPHY (CTA): ICD-10-CM

## 2025-01-29 LAB
ATRIAL RATE: 82 BPM
CARDIAC TROPONIN I PNL SERPL HS: 3 NG/L (ref ?–50)
FLUAV RNA RESP QL NAA+PROBE: POSITIVE
FLUBV RNA RESP QL NAA+PROBE: NEGATIVE
P AXIS: 62 DEGREES
PR INTERVAL: 140 MS
QRS AXIS: 70 DEGREES
QRSD INTERVAL: 96 MS
QT INTERVAL: 350 MS
QTC INTERVAL: 408 MS
RSV RNA RESP QL NAA+PROBE: NEGATIVE
SARS-COV-2 RNA RESP QL NAA+PROBE: NEGATIVE
T WAVE AXIS: 53 DEGREES
VENTRICULAR RATE: 82 BPM

## 2025-01-29 PROCEDURE — 99214 OFFICE O/P EST MOD 30 MIN: CPT

## 2025-01-29 PROCEDURE — 80061 LIPID PANEL: CPT

## 2025-01-29 PROCEDURE — 36415 COLL VENOUS BLD VENIPUNCTURE: CPT

## 2025-01-29 PROCEDURE — 93010 ELECTROCARDIOGRAM REPORT: CPT | Performed by: INTERNAL MEDICINE

## 2025-01-29 RX ORDER — BUSPIRONE HYDROCHLORIDE 15 MG/1
15 TABLET ORAL EVERY 12 HOURS
Qty: 60 TABLET | Refills: 0 | Status: SHIPPED | OUTPATIENT
Start: 2025-01-29

## 2025-01-29 RX ORDER — FLUTICASONE PROPIONATE 50 MCG
1 SPRAY, SUSPENSION (ML) NASAL DAILY
Qty: 16 G | Refills: 0 | Status: SHIPPED | OUTPATIENT
Start: 2025-01-29

## 2025-01-29 RX ORDER — BENZONATATE 100 MG/1
100 CAPSULE ORAL 3 TIMES DAILY PRN
Qty: 20 CAPSULE | Refills: 0 | Status: SHIPPED | OUTPATIENT
Start: 2025-01-29

## 2025-01-29 NOTE — DISCHARGE INSTRUCTIONS
I would restart your metoprolol.  You have an appoint with your family doctor tomorrow I would discuss this with them.    Increase fluids and use over-the-counter cough and cold medicines as needed for your flu.    Return to the ER for any new, concerning, worsening issues.

## 2025-01-29 NOTE — ASSESSMENT & PLAN NOTE
Reports when he takes the Toprol palpitations resolve -- therefore advised to continue medication as prescribed  Has follow up with cardiology on 2/7 to rule out cardiac cause, however cardiac work up thus far has been negative which is reassuring

## 2025-01-29 NOTE — ED PROVIDER NOTES
Time reflects when diagnosis was documented in both MDM as applicable and the Disposition within this note       Time User Action Codes Description Comment    1/29/2025 12:09 AM Dale Zarate [J10.1] Influenza A           ED Disposition       ED Disposition   Discharge    Condition   Stable    Date/Time   Wed Jan 29, 2025 12:16 AM    Comment   Tai Chance discharge to home/self care.                   Assessment & Plan       Medical Decision Making  34-year-old male presents emergency department complaining of chest tightness congestion and difficulty breathing through his nose.  The patient was also having tingling in his left shoulder and arm as well as his leg yesterday and was concerned he could be having a heart attack.  The patient appears to be anxious at rest and had his wife go out and buy a blood pressure cuff and has been taking his blood pressure cuff multiple times today.  When his blood pressure got up to 160-170 systolic, he noted he needed to come to the hospital.  Differential diagnosis is pneumonia coronary artery disease anxiety electrolyte abnormalities or pulmonary embolus.  D-dimer is nonacute and chest x-ray is also without acute pulmonary pathology.  Patient's vital signs are stable.  Patient is however flu a positive.  Patient has a troponin of 3 despite having symptoms since yesterday as well as an ER visit yesterday with negative troponins.  I do not feel that this is a cardiac event.  Patient should follow-up with his family doctor and was told that he should consider continuing the metoprolol that he was prescribed and is no longer taking.  Patient discharged.    Amount and/or Complexity of Data Reviewed  Labs: ordered. Decision-making details documented in ED Course.  Radiology: ordered and independent interpretation performed.        ED Course as of 01/29/25 0311   Tue Jan 28, 2025   8423 Patient describes his chest pressure as congestion and a sensation of tightness as well as  not being able to breathe through his nose.   2257 Patient also was supposed to be taking metoprolol for palpitations and has not.   Wed Jan 29, 2025   0005 INFLU A PCR(!): Positive       Medications - No data to display    ED Risk Strat Scores                          SBIRT 22yo+      Flowsheet Row Most Recent Value   Initial Alcohol Screen: US AUDIT-C     1. How often do you have a drink containing alcohol? 0 Filed at: 01/28/2025 2228   2. How many drinks containing alcohol do you have on a typical day you are drinking?  0 Filed at: 01/28/2025 2228   3a. Male UNDER 65: How often do you have five or more drinks on one occasion? 0 Filed at: 01/28/2025 2228   3b. FEMALE Any Age, or MALE 65+: How often do you have 4 or more drinks on one occassion? 0 Filed at: 01/28/2025 2228   Audit-C Score 0 Filed at: 01/28/2025 2228   JEMMA: How many times in the past year have you...    Used an illegal drug or used a prescription medication for non-medical reasons? Never Filed at: 01/28/2025 2228                            History of Present Illness       Chief Complaint   Patient presents with    Chest Pain     Patient was seen at [facility] yesterday for similar symptoms of headaches, numbness, and tremors but starting with episodes of palpitations and chest pressure since leaving the hospital yesterday.        Past Medical History:   Diagnosis Date    Murmur, cardiac       Past Surgical History:   Procedure Laterality Date    COLONOSCOPY        Family History   Problem Relation Age of Onset    Thyroid cancer Mother     Heart attack Father     Diabetes Maternal Grandmother       Social History     Tobacco Use    Smoking status: Never     Passive exposure: Never    Smokeless tobacco: Never   Vaping Use    Vaping status: Never Used   Substance Use Topics    Alcohol use: Yes     Comment: occasionally    Drug use: Yes     Frequency: 7.0 times per week     Types: Marijuana     Comment: once a day      E-Cigarette/Vaping     E-Cigarette Use Never User       E-Cigarette/Vaping Substances      I have reviewed and agree with the history as documented.     34-year-old male presents emergency department complaining of chest pain.  Patient notes that he was recently seen in the emergency department yesterday for left arm and left leg numbness and headache.  Patient states that he feels he cannot breathe through his nose and that he was still felt tight in his chest.  Patient notes he also had his wife get him a blood pressure cuff and has been taking his blood pressure multiple times during the day and when he saw that it was 160-170 systolic, she told him to bring him back to the hospital.  Patient has a history of anxiety as well as a family history of heart disease where his father  in his 50s to 60s of an MI.        Review of Systems   Constitutional:  Positive for activity change. Negative for chills and fever.   HENT:  Negative for ear pain and sore throat.    Eyes:  Negative for pain and visual disturbance.   Respiratory:  Negative for cough and shortness of breath.    Cardiovascular:  Positive for chest pain. Negative for palpitations.   Gastrointestinal:  Negative for abdominal pain and vomiting.   Genitourinary:  Negative for dysuria and hematuria.   Musculoskeletal:  Negative for arthralgias and back pain.   Skin:  Negative for color change and rash.   Neurological:  Negative for seizures and syncope.   All other systems reviewed and are negative.          Objective       ED Triage Vitals   Temperature Pulse Blood Pressure Respirations SpO2 Patient Position - Orthostatic VS   25 0018 25   98.2 °F (36.8 °C) 77 122/79 16 97 % Sitting      Temp Source Heart Rate Source BP Location FiO2 (%) Pain Score    25 0018 25 2222 25 222 -- 25    Temporal Monitor Left arm  8      Vitals      Date and Time Temp Pulse SpO2 Resp BP Pain Score  FACES Pain Rating User   01/29/25 0018 98.2 °F (36.8 °C) -- -- -- -- -- -- EZ   01/29/25 0015 -- 85 97 % 18 134/78 No Pain -- EZ   01/29/25 0000 -- 78 96 % -- 130/81 -- -- EZ   01/28/25 2315 -- 83 97 % 8 141/81 -- -- EZ   01/28/25 2300 -- 81 97 % 15 144/80 -- -- EZ   01/28/25 2230 -- 78 97 % 14 127/81 No Pain -- EZ   01/28/25 2223 -- -- 97 % -- 122/79 8 -- SG   01/28/25 2222 -- 77 -- 16 -- -- -- SG            Physical Exam  Constitutional:       General: He is not in acute distress.     Appearance: Normal appearance. He is normal weight. He is not ill-appearing.   HENT:      Head: Normocephalic and atraumatic.      Right Ear: External ear normal.      Left Ear: External ear normal.      Nose: Nose normal.      Mouth/Throat:      Mouth: Mucous membranes are moist.   Eyes:      Conjunctiva/sclera: Conjunctivae normal.   Cardiovascular:      Rate and Rhythm: Normal rate and regular rhythm.      Pulses: Normal pulses.      Heart sounds: Normal heart sounds.   Pulmonary:      Effort: Pulmonary effort is normal.      Breath sounds: Normal breath sounds.   Abdominal:      General: Abdomen is flat. There is no distension.      Palpations: Abdomen is soft. There is no mass.   Musculoskeletal:         General: No swelling, tenderness or deformity. Normal range of motion.      Cervical back: Normal range of motion.   Skin:     General: Skin is warm and dry.      Capillary Refill: Capillary refill takes 2 to 3 seconds.      Coloration: Skin is not pale.   Neurological:      General: No focal deficit present.      Mental Status: He is alert and oriented to person, place, and time. Mental status is at baseline.   Psychiatric:         Mood and Affect: Mood normal.         Results Reviewed       Procedure Component Value Units Date/Time    HS Troponin 0hr (reflex protocol) [[id number]]  (Normal) Collected: 01/28/25 6252    Lab Status: Final result Specimen: Blood from Arm, Right Updated: 01/29/25 0002     hs TnI 0hr 3 ng/L      FLU/RSV/COVID - if FLU/RSV clinically relevant (2hr TAT) [[id number]]  (Abnormal) Collected: 01/28/25 2316    Lab Status: Final result Specimen: Nares from Nose Updated: 01/29/25 0001     SARS-CoV-2 Negative     INFLUENZA A PCR Positive     INFLUENZA B PCR Negative     RSV PCR Negative    Narrative:      This test has been performed using the CoV-2/Flu/RSV plus assay on the [employer] GeneXpert platform. This test has been validated by the  and verified by the performing laboratory.     This test is designed to amplify and detect the following: nucleocapsid (N), envelope (E), and RNA-dependent RNA polymerase (RdRP) genes of the SARS-CoV-2 genome; matrix (M), basic polymerase (PB2), and acidic protein (PA) segments of the influenza A genome; matrix (M) and non-structural protein (NS) segments of the influenza B genome, and the nucleocapsid genes of RSV A and RSV B.     Positive results are indicative of the presence of Flu A, Flu B, RSV, and/or SARS-CoV-2 RNA. Positive results for SARS-CoV-2 or suspected novel influenza should be reported to state, local, or federal health departments according to local reporting requirements.      All results should be assessed in conjunction with clinical presentation and other laboratory markers for clinical management.     FOR PEDIATRIC PATIENTS - copy/paste COVID Guidelines URL to browser: [email or web address]       Comprehensive metabolic panel [[id number]] Collected: 01/28/25 2335    Lab Status: Final result Specimen: Blood from Arm, Right Updated: 01/28/25 9197     Sodium 139 mmol/L      Potassium 3.5 mmol/L      Chloride 106 mmol/L      CO2 24 mmol/L      ANION GAP 9 mmol/L      BUN 17 mg/dL      Creatinine 1.11 mg/dL      Glucose 106 mg/dL      Calcium 9.1 mg/dL      AST 17 U/L      ALT 23 U/L      Alkaline Phosphatase 45 U/L      Total Protein 7.4 g/dL      Albumin 4.3 g/dL      Total Bilirubin 0.36 mg/dL       eGFR 84 ml/min/1.73sq m     Narrative:      National Kidney Disease Foundation guidelines for Chronic Kidney Disease (CKD):     Stage 1 with normal or high GFR (GFR > 90 mL/min/1.73 square meters)    Stage 2 Mild CKD (GFR = 60-89 mL/min/1.73 square meters)    Stage 3A Moderate CKD (GFR = 45-59 mL/min/1.73 square meters)    Stage 3B Moderate CKD (GFR = 30-44 mL/min/1.73 square meters)    Stage 4 Severe CKD (GFR = 15-29 mL/min/1.73 square meters)    Stage 5 End Stage CKD (GFR <15 mL/min/1.73 square meters)  Note: GFR calculation is accurate only with a steady state creatinine    D-Dimer [[id number]]  (Normal) Collected: 01/28/25 2335    Lab Status: Final result Specimen: Blood from Arm, Right Updated: 01/28/25 2353     D-Dimer, Quant 0.28 ug/ml FEU     Protime-INR [[id number]]  (Normal) Collected: 01/28/25 2335    Lab Status: Final result Specimen: Blood from Arm, Right Updated: 01/28/25 2351     Protime 13.1 seconds      INR 0.94    Narrative:      INR Therapeutic Range    Indication                                             INR Range      Atrial Fibrillation                                               2.0-3.0  Hypercoagulable State                                    2.0.2.3  Left Ventricular Asist Device                            2.0-3.0  Mechanical Heart Valve                                  -    Aortic(with afib, MI, embolism, HF, LA enlargement,    and/or coagulopathy)                                     2.0-3.0 (2.5-3.5)     Mitral                                                             2.5-3.5  Prosthetic/Bioprosthetic Heart Valve               2.0-3.0  Venous thromboembolism (VTE: VT, PE        2.0-3.0    APTT [[id number]]  (Normal) Collected: 01/28/25 2335    Lab Status: Final result Specimen: Blood from Arm, Right Updated: 01/28/25 2351     PTT 27 seconds     CBC and differential [[id number]]  (Abnormal) Collected: 01/28/25 2335    Lab Status: Final result Specimen: Blood from Arm, Right Updated:  01/28/25 2340     WBC 7.81 Thousand/uL      RBC 4.75 Million/uL      Hemoglobin 13.6 g/dL      Hematocrit 41.5 %      MCV 87 fL      MCH 28.6 pg      MCHC 32.8 g/dL      RDW 12.4 %      MPV 8.8 fL      Platelets 289 Thousands/uL      nRBC 0 /100 WBCs      Segmented % 69 %      Immature Grans % 0 %      Lymphocytes % 25 %      Monocytes % 6 %      Eosinophils Relative 0 %      Basophils Relative 0 %      Absolute Neutrophils 5.35 Thousands/µL      Absolute Immature Grans 0.03 Thousand/uL      Absolute Lymphocytes 1.97 Thousands/µL      Absolute Monocytes 0.44 Thousand/µL      Eosinophils Absolute 0.00 Thousand/µL      Basophils Absolute 0.02 Thousands/µL             XR chest 1 view portable   ED Interpretation by Dale Zarate Jr., DO (01/28 2347)   No acute pulmonary pathology.          ECG 12 Lead Documentation Only    Date/Time: 1/28/2025 10:55 PM    Performed by: Dale Zarate Jr., DO  Authorized by: Dale Zarate Jr., DO    ECG reviewed by me, the ED Provider: yes    Patient location:  ED  Comments:      EKG is a sinus rhythm at 82 beats a minute with a normal axis.  There is no definitive acute ST or T wave changes present.      ED Medication and Procedure Management   Prior to Admission Medications   Prescriptions Last Dose Informant Patient Reported? Taking?   busPIRone (BUSPAR) 10 mg tablet   No No   Sig: Take 1 tablet (10 mg total) by mouth every 12 (twelve) hours   metoprolol succinate (TOPROL-XL) 25 mg 24 hr tablet   No No   Sig: TAKE 1/2 TABLET BY MOUTH EVERY DAY   ondansetron (ZOFRAN-ODT) 4 mg disintegrating tablet  Self No No   Sig: Take 1 tablet (4 mg total) by mouth every 6 (six) hours as needed for nausea or vomiting      Facility-Administered Medications: None     Discharge Medication List as of 1/29/2025 12:17 AM        CONTINUE these medications which have NOT CHANGED    Details   busPIRone (BUSPAR) 10 mg tablet Take 1 tablet (10 mg total) by mouth every 12 (twelve) hours, Starting  Fri 1/17/2025, Normal      metoprolol succinate (TOPROL-XL) 25 mg 24 hr tablet TAKE 1/2 TABLET BY MOUTH EVERY DAY, Starting Wed 1/8/2025, Normal      ondansetron (ZOFRAN-ODT) 4 mg disintegrating tablet Take 1 tablet (4 mg total) by mouth every 6 (six) hours as needed for nausea or vomiting, Starting Sun 12/29/2024, Normal           No discharge procedures on file.  ED SEPSIS DOCUMENTATION   Time reflects when diagnosis was documented in both MDM as applicable and the Disposition within this note       Time User Action Codes Description Comment    1/29/2025 12:09 AM Dale Zarate Add [J10.1] Influenza A                  Dale Zarate Jr., DO  01/29/25 0311

## 2025-01-29 NOTE — PROGRESS NOTES
"Name: Tai Chance      : 1990      MRN: [id number]  Encounter Provider: Rosey Mckeon PA-C  Encounter Date: 2025   Encounter department: [facility]  :  Assessment & Plan  Influenza A  Current influenza A infection explains patient's cough, chest congestion and headaches -- CXR completed in ED and read by ED provider as unremarkable, no final read available at this time  Patient's lungs are clear on exam today, with O2 97% on room air and afebrile which is reassuring  Provided tessalon perles prn for cough and flonase for nasal congestion  Otherwise, recommended supportive care, increase hydration and rest    Orders:    benzonatate (TESSALON PERLES) 100 mg capsule; Take 1 capsule (100 mg total) by mouth 3 (three) times a day as needed for cough    fluticasone (FLONASE) 50 mcg/act nasal spray; 1 spray into each nostril daily    Abnormal computed tomography angiography (CTA)  CTA head/neck on  in ED showed \"Normal CTA of the head and neck. 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   Patient would like to see specialist to make sure everything is ok -- therefore, referral placed.   Reports headache has resolved. Discussed likely related to current influenza infection and not related to CTA finding.     Orders:    Ambulatory Referral to Neurosurgery; Future    Anxiety  Patient feels anxiety continues to contribute to a lot of his physical symptoms which I agree with -- he does note improvement since increasing his buspar at last visit  Therefore, will further increase buspar to 15 mg BID for better control of symptoms  Will follow up in two weeks for re-evaluation  To call sooner with " "any questions or concerns  Continues to follow with talk therapist as well     Orders:    busPIRone (BUSPAR) 15 mg tablet; Take 1 tablet (15 mg total) by mouth every 12 (twelve) hours    Palpitations  Reports when he takes the Toprol palpitations resolve -- therefore advised to continue medication as prescribed  Has follow up with cardiology on 2/7 to rule out cardiac cause, however cardiac work up thus far has been negative which is reassuring        Lipid screening    Orders:    Lipid panel; Future           History of Present Illness   CC: ED follow up     Patient presents for ED follow up. Patient was initially seen in ED on 1/27/25 for headache and left sided numbness. CTA head/neck completed which showed \"Normal CTA of the head and neck. 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     However, next day 1/28 patient developed chest congestion and tightness. Full cardiac work up including d-dimer and troponin were completed which were negative. CXR unremarkable. Patient was tested for flu/covid and was POSITIVE for influenza A. They suspected symptoms were related to influenza infection and patient was discharge home and advised to follow up with PCP.     Patient reports today feeling better. Reports still congested and nose is stuffed up and is coughing. Still has some chest congestion but no chest pain or palpitations. He took his toprol this morning so has not had any palpitations.   He feels like his anxiety continues to be bad -- noticeable improvement on the buspar 10 mg BID, wondering if we can increase his dosage. No side effects.   Denies any current headache, dizziness, chest pain or SOB.   Reports daughter also has flu A.     Review of Systems " "  Constitutional:  Negative for chills, diaphoresis and fever.   HENT:  Positive for congestion, postnasal drip and rhinorrhea. Negative for sore throat.    Respiratory:  Positive for cough. Negative for chest tightness, shortness of breath and wheezing.    Cardiovascular:  Negative for chest pain, palpitations and leg swelling.   Genitourinary:  Negative for difficulty urinating.   Neurological:  Negative for dizziness, light-headedness and headaches.     Objective   /82   Pulse 83   Temp 98 °F (36.7 °C)   Ht 6' 2\" (1.88 m)   Wt 91.7 kg (202 lb 3.2 oz)   SpO2 97%   BMI 25.96 kg/m²      Physical Exam  Vitals reviewed.   Constitutional:       General: He is not in acute distress.     Appearance: Normal appearance. He is not ill-appearing or diaphoretic.   HENT:      Head: Normocephalic and atraumatic.      Right Ear: Tympanic membrane, ear canal and external ear normal. There is no impacted cerumen.      Left Ear: Tympanic membrane, ear canal and external ear normal. There is no impacted cerumen.      Nose: Congestion present. No rhinorrhea.      Mouth/Throat:      Mouth: Mucous membranes are moist.      Pharynx: Oropharynx is clear. No oropharyngeal exudate or posterior oropharyngeal erythema.   Eyes:      General:         Right eye: No discharge.         Left eye: No discharge.      Conjunctiva/sclera: Conjunctivae normal.   Cardiovascular:      Rate and Rhythm: Normal rate and regular rhythm.      Pulses: Normal pulses.      Heart sounds: Normal heart sounds. No murmur heard.  Pulmonary:      Effort: Pulmonary effort is normal. No respiratory distress.      Breath sounds: Normal breath sounds. No wheezing, rhonchi or rales.   Musculoskeletal:         General: Normal range of motion.      Cervical back: Normal range of motion and neck supple.      Right lower leg: No edema.      Left lower leg: No edema.   Lymphadenopathy:      Cervical: No cervical adenopathy.   Skin:     General: Skin is warm. "   Neurological:      General: No focal deficit present.      Mental Status: He is alert.      Gait: Gait normal.   Psychiatric:         Mood and Affect: Mood normal.

## 2025-01-29 NOTE — ASSESSMENT & PLAN NOTE
Patient feels anxiety continues to contribute to a lot of his physical symptoms which I agree with -- he does note improvement since increasing his buspar at last visit  Therefore, will further increase buspar to 15 mg BID for better control of symptoms  Will follow up in two weeks for re-evaluation  To call sooner with any questions or concerns  Continues to follow with talk therapist as well     Orders:    busPIRone (BUSPAR) 15 mg tablet; Take 1 tablet (15 mg total) by mouth every 12 (twelve) hours

## 2025-01-30 ENCOUNTER — RESULTS FOLLOW-UP (OUTPATIENT)
Dept: FAMILY MEDICINE CLINIC | Facility: CLINIC | Age: 35
End: 2025-01-30

## 2025-01-30 LAB
CHOLEST SERPL-MCNC: 194 MG/DL (ref ?–200)
HDLC SERPL-MCNC: 36 MG/DL
LDLC SERPL CALC-MCNC: 129 MG/DL (ref 0–100)
NONHDLC SERPL-MCNC: 158 MG/DL
TRIGL SERPL-MCNC: 143 MG/DL (ref ?–150)

## 2025-02-07 ENCOUNTER — CONSULT (OUTPATIENT)
Dept: CARDIOLOGY CLINIC | Facility: CLINIC | Age: 35
End: 2025-02-07
Payer: COMMERCIAL

## 2025-02-07 VITALS
DIASTOLIC BLOOD PRESSURE: 84 MMHG | HEART RATE: 108 BPM | WEIGHT: 206 LBS | OXYGEN SATURATION: 96 % | HEIGHT: 74 IN | BODY MASS INDEX: 26.44 KG/M2 | SYSTOLIC BLOOD PRESSURE: 120 MMHG | RESPIRATION RATE: 16 BRPM

## 2025-02-07 DIAGNOSIS — Z82.49 FAMILY HISTORY OF PREMATURE CAD: ICD-10-CM

## 2025-02-07 DIAGNOSIS — R00.2 PALPITATION: ICD-10-CM

## 2025-02-07 DIAGNOSIS — E78.2 HYPERLIPEMIA, MIXED: ICD-10-CM

## 2025-02-07 DIAGNOSIS — R07.9 CHEST PAIN, UNSPECIFIED TYPE: Primary | ICD-10-CM

## 2025-02-07 PROCEDURE — 99244 OFF/OP CNSLTJ NEW/EST MOD 40: CPT | Performed by: INTERNAL MEDICINE

## 2025-02-07 NOTE — PROGRESS NOTES
Cardiology Consultation     Tai Chance  [id number]  1990  [facility]  [facility] [place] [id number]    Assessment & Plan  Chest pain, unspecified type  Results of multiple cardiac testing including clinical impression the chest pain is unlikely cardiac in origin were discussed at length with the patient.   Palpitation  Based on the review of his multiple cardiac testing, no apparent arrhythmia or structural heart disease and symptom could be anxiety related.  Continue with oral beta-blocker for now.  Hyperlipemia, mixed  Recent lipid profile on 2025 showed improvement compared to 2024. Therapeutic lifestyle changes with health healthy low salt diet and regular aerobic exercise were recommended.  We also discussed DASH diet and was given handout on this.  Family history of premature CAD  Given family history of premature CAD in the patient's age of less than 40, CT for coronary artery calcium scoring is not applicable.  As an alternative, Lipoprotein a, Apo B were requested to enhance risk stratification.     HPI:  This is a 34-year-old male with history of anxiety, hyperlipidemia, family history of premature CAD with father  of MI in his 50s referred to cardiology for evaluation of palpitations and chest pain. Over the past 4 to 5 months, patient is been having intermittent palpitations described as heart racing with chest discomfort.  The symptoms are random and episodic. However, he reported palpitation worsened during his recent flu infection.      Cardiac evaluations for the symptoms included an exercise treadmill stress testing on 2024 which was negative for ischemia, no arrhythmias and no angina during stress, normal blood pressure response.  Transthoracic echocardiogram on 2024 was reported to be essentially normal.  He also had a 48-hour Holter  monitor in January 2024 which was also unremarkable and no associated arrhythmia with the reported symptoms of fluttering.         Patient Active Problem List   Diagnosis    Palpitations    Anxiety     Past Medical History:   Diagnosis Date    Murmur, cardiac      Social History     Socioeconomic History    Marital status: Single     Spouse name: Not on file    Number of children: Not on file    Years of education: Not on file    Highest education level: Not on file   Occupational History    Not on file   Tobacco Use    Smoking status: Never     Passive exposure: Never    Smokeless tobacco: Never   Vaping Use    Vaping status: Never Used   Substance and Sexual Activity    Alcohol use: Yes     Comment: occasionally    Drug use: Yes     Frequency: 7.0 times per week     Types: Marijuana     Comment: once a day    Sexual activity: Not on file   Other Topics Concern    Not on file   Social History Narrative    Not on file     Social Drivers of Health     Financial Resource Strain: Not on file   Food Insecurity: Not on file   Transportation Needs: Not on file   Physical Activity: Not on file   Stress: Not on file   Social Connections: Unknown (6/18/2024)    Received from [place]     How often do you feel lonely or isolated from those around you? (Adult - for ages 18 years and over): Not on file   Intimate Partner Violence: Not on file   Housing Stability: Not on file      Family History   Problem Relation Age of Onset    Thyroid cancer Mother     Other (AIDS) Mother     Heart attack Father 50 - 59    Diabetes Maternal Grandmother      Past Surgical History:   Procedure Laterality Date    COLONOSCOPY         Current Outpatient Medications:     busPIRone (BUSPAR) 15 mg tablet, Take 1 tablet (15 mg total) by mouth every 12 (twelve) hours, Disp: 60 tablet, Rfl: 0    metoprolol succinate (TOPROL-XL) 25 mg 24 hr tablet, TAKE 1/2 TABLET BY MOUTH EVERY DAY, Disp: 45 tablet, Rfl: 1    benzonatate (TESSALON  "PERLES) 100 mg capsule, Take 1 capsule (100 mg total) by mouth 3 (three) times a day as needed for cough (Patient not taking: Reported on 2/7/2025), Disp: 20 capsule, Rfl: 0    fluticasone (FLONASE) 50 mcg/act nasal spray, 1 spray into each nostril daily (Patient not taking: Reported on 2/7/2025), Disp: 16 g, Rfl: 0    ondansetron (ZOFRAN-ODT) 4 mg disintegrating tablet, Take 1 tablet (4 mg total) by mouth every 6 (six) hours as needed for nausea or vomiting (Patient not taking: Reported on 2/7/2025), Disp: 20 tablet, Rfl: 0  No Known Allergies  Vitals:    02/07/25 1503   BP: 120/84   BP Location: Left arm   Patient Position: Sitting   Cuff Size: Standard   Pulse: (!) 108   Resp: 16   SpO2: 96%   Weight: 93.4 kg (206 lb)   Height: 6' 2\" (1.88 m)       Labs:  Appointment on 01/29/2025   Component Date Value    Cholesterol 01/29/2025 194     Triglycerides 01/29/2025 143     HDL, Direct 01/29/2025 36 (L)     LDL Calculated 01/29/2025 129 (H)     Non-HDL-Chol (CHOL-HDL) 01/29/2025 158    Admission on 01/28/2025, Discharged on 01/29/2025   Component Date Value    Ventricular Rate 01/28/2025 82     Atrial Rate 01/28/2025 82     ME Interval 01/28/2025 140     QRSD Interval 01/28/2025 96     QT Interval 01/28/2025 350     QTC Interval 01/28/2025 408     P Axis 01/28/2025 62     QRS Axis 01/28/2025 70     T Wave [place] 01/28/2025 53     SARS-CoV-2 01/28/2025 Negative     INFLUENZA A PCR 01/28/2025 Positive (A)     INFLUENZA B PCR 01/28/2025 Negative     RSV PCR 01/28/2025 Negative     WBC 01/28/2025 7.81     RBC 01/28/2025 4.75     Hemoglobin 01/28/2025 13.6     Hematocrit 01/28/2025 41.5     MCV 01/28/2025 87     MCH 01/28/2025 28.6     MCHC 01/28/2025 32.8     RDW 01/28/2025 12.4     MPV 01/28/2025 8.8 (L)     Platelets 01/28/2025 289     nRBC 01/28/2025 0     Segmented % 01/28/2025 69     Immature Grans % 01/28/2025 0     Lymphocytes % 01/28/2025 25     Monocytes % 01/28/2025 6     Eosinophils Relative 01/28/2025 0     " Basophils Relative 01/28/2025 0     Absolute Neutrophils 01/28/2025 5.35     Absolute Immature Grans 01/28/2025 0.03     Absolute Lymphocytes 01/28/2025 1.97     Absolute Monocytes 01/28/2025 0.44     Eosinophils Absolute 01/28/2025 0.00     Basophils Absolute 01/28/2025 0.02     Protime 01/28/2025 13.1     INR 01/28/2025 0.94     PTT 01/28/2025 27     Sodium 01/28/2025 139     Potassium 01/28/2025 3.5     Chloride 01/28/2025 106     CO2 01/28/2025 24     ANION GAP 01/28/2025 9     BUN 01/28/2025 17     Creatinine 01/28/2025 1.11     Glucose 01/28/2025 106     Calcium 01/28/2025 9.1     AST 01/28/2025 17     ALT 01/28/2025 23     Alkaline Phosphatase 01/28/2025 45     Total Protein 01/28/2025 7.4     Albumin 01/28/2025 4.3     Total Bilirubin 01/28/2025 0.36     eGFR 01/28/2025 84     hs TnI 0hr 01/28/2025 3     D-Dimer, Quant 01/28/2025 0.28    Admission on 01/27/2025, Discharged on 01/27/2025   Component Date Value    Ventricular Rate 01/27/2025 96     Atrial Rate 01/27/2025 96     UT Interval 01/27/2025 144     QRSD Interval 01/27/2025 88     QT Interval 01/27/2025 332     QTC Interval 01/27/2025 419     P Axis 01/27/2025 56     QRS Axis 01/27/2025 71     T Wave [place] 01/27/2025 51     WBC 01/27/2025 3.37 (L)     RBC 01/27/2025 4.94     Hemoglobin 01/27/2025 13.9     Hematocrit 01/27/2025 42.3     MCV 01/27/2025 86     MCH 01/27/2025 28.1     MCHC 01/27/2025 32.9     RDW 01/27/2025 12.5     MPV 01/27/2025 8.6 (L)     Platelets 01/27/2025 249     nRBC 01/27/2025 0     Segmented % 01/27/2025 47     Immature Grans % 01/27/2025 0     Lymphocytes % 01/27/2025 42     Monocytes % 01/27/2025 10     Eosinophils Relative 01/27/2025 1     Basophils Relative 01/27/2025 0     Absolute Neutrophils 01/27/2025 1.57 (L)     Absolute Immature Grans 01/27/2025 0.01     Absolute Lymphocytes 01/27/2025 1.41     Absolute Monocytes 01/27/2025 0.35     Eosinophils Absolute 01/27/2025 0.02     Basophils Absolute 01/27/2025 0.01     Sodium  01/27/2025 137     Potassium 01/27/2025 3.9     Chloride 01/27/2025 104     CO2 01/27/2025 25     ANION GAP 01/27/2025 8     BUN 01/27/2025 15     Creatinine 01/27/2025 1.21     Glucose 01/27/2025 128     Calcium 01/27/2025 9.5     eGFR 01/27/2025 76     hs TnI 0hr 01/27/2025 <2    Hospital Outpatient Visit on 01/03/2025   Component Date Value    Case Report 01/03/2025                      Value:Surgical Pathology Report                         Case: [id number]                                  Authorizing Provider:  Art Morton MD           Collected:           01/03/2025 1244              Ordering Location:     [facility] Received:            01/03/2025 1626                                     Endoscopy                                                                    Pathologist:           Estelle Muñiz MD                                                       Specimens:   A) - Colon, random colon                                                                            B) - Polyp, Colorectal, transverse colon polyp x 1                                                  C) - Polyp, Colorectal, descending colon polyp x 1                                                  D) - Polyp, Colorectal, sigmoid colon polyp x 2                                            Final Diagnosis 01/03/2025                      Value:A. Colon, random colon:  - Benign colonic mucosa with no significant histopathologic abnormality  - No thickened basement membranes or intraepithelial lymphocytosis to suggest microscopic colitis (i.e. collagenous colitis or lymphocytic colitis)  - No active or chronic colitis  - No glandular dysplasia and no evidence of malignancy      B. Polyp, Colorectal, transverse colon polyp x 1:  - Fragments of tubular adenoma  - Negative for high-grade dysplasia and malignancy      C. Polyp, Colorectal, descending colon polyp x 1:  - Benign polypoid colonic mucosa with prominent reactive  "lymphoid follicle  - Negative for dysplasia and malignancy  - Deeper levels examined      D. Polyp, Colorectal, sigmoid colon polyp x 2:  - Fragments of benign polypoid colonic mucosa with prominent reactive lymphoid follicles and mildly hyperplastic surface epithelium  - Negative for dysplasia and malignancy  - Deeper levels examined           Additional Information 01/03/2025                      Value:All reported additional testing was performed with appropriately reactive controls.  These tests were developed and their performance characteristics determined by [facility] or appropriate performing facility, though some tests may be performed on tissues which have not been validated for performance characteristics (such as staining performed on alcohol exposed cell blocks and decalcified tissues).  Results should be interpreted with caution and in the context of the patients’ clinical condition. These tests may not be cleared or approved by the U.S. Food and Drug Administration, though the FDA has determined that such clearance or approval is not necessary. These tests are used for clinical purposes and they should not be regarded as investigational or for research. This laboratory has been approved by [facility], designated as a high-complexity laboratory and is qualified to perform these tests.    Interpretation performed at [facility], [facility] [address]   .      Synoptic Checklist 01/03/2025                      Value:                            COLON/RECTUM POLYP FORM - GI - B                                                                                     :    Adenoma(s)                                                         :    HP      Gross Description 01/03/2025                      Value:A. The specimen is received in formalin, labeled with the patient's name and hospital number, and is designated \" random colon biopsy\".  The " "specimen consists of multiple tan irregularly-shaped tissue fragments measuring in aggregate of 0.9 x 0.5 x 0.2 cm.  Entirely submitted.  One screened cassette.    B. The specimen is received in formalin, labeled with the patient's name and hospital number, and is designated \" transverse colon polyp x 1\".  The specimen consists of a single tan tissue fragment measuring 0.4 x 0.2 x 0.1 cm.  Entirely submitted.  One screened cassette.    C. The specimen is received in formalin, labeled with the patient's name and hospital number, and is designated \" descending colon polyp x 1\".  The specimen consists of a single tan tissue fragment measuring 0.4 x 0.2 x 0.1 cm.  Entirely submitted.  One screened cassette.    D. The specimen is received in formalin, labeled with the patient's name and hospital number, and is designated \" sigmoid colon polyp x 2\".                            The specimen consists of 2 tan tissue fragments measuring 0.3 cm in greatest dimension each.  The specimen is entirely submitted in a screened cassette.    Note: The estimated total formalin fixation time based upon information provided by the submitting clinician and the standard processing schedule is under 72 hours. Mukund Wilcox      Clinical Information 01/03/2025                      Value:Cold bx r/o microscopic colitis   Admission on 12/29/2024, Discharged on 12/29/2024   Component Date Value    WBC 12/29/2024 3.74 (L)     RBC 12/29/2024 5.18     Hemoglobin 12/29/2024 14.6     Hematocrit 12/29/2024 44.8     MCV 12/29/2024 87     MCH 12/29/2024 28.2     MCHC 12/29/2024 32.6     RDW 12/29/2024 12.2     MPV 12/29/2024 8.7 (L)     Platelets 12/29/2024 332     nRBC 12/29/2024 0     Segmented % 12/29/2024 48     Immature Grans % 12/29/2024 1     Lymphocytes % 12/29/2024 42     Monocytes % 12/29/2024 7     Eosinophils Relative 12/29/2024 1     Basophils Relative 12/29/2024 1     Absolute Neutrophils 12/29/2024 1.85     Absolute Immature Grans " 12/29/2024 0.02     Absolute Lymphocytes 12/29/2024 1.56     Absolute Monocytes 12/29/2024 0.25     Eosinophils Absolute 12/29/2024 0.03     Basophils Absolute 12/29/2024 0.03     Sodium 12/29/2024 139     Potassium 12/29/2024 3.8     Chloride 12/29/2024 106     CO2 12/29/2024 28     ANION GAP 12/29/2024 5     BUN 12/29/2024 15     Creatinine 12/29/2024 1.17     Glucose 12/29/2024 98     Calcium 12/29/2024 9.7     AST 12/29/2024 13     ALT 12/29/2024 15     Alkaline Phosphatase 12/29/2024 45     Total Protein 12/29/2024 7.9     Albumin 12/29/2024 4.6     Total Bilirubin 12/29/2024 0.48     eGFR 12/29/2024 79     Lipase 12/29/2024 41     Color, UA 12/29/2024 Yellow     Clarity, UA 12/29/2024 Clear     Specific Gravity, UA 12/29/2024 1.010     pH, UA 12/29/2024 6.0     Leukocytes, UA 12/29/2024 Negative     Nitrite, UA 12/29/2024 Negative     Protein, UA 12/29/2024 Negative     Glucose, UA 12/29/2024 Negative     Ketones, UA 12/29/2024 Negative     Urobilinogen, UA 12/29/2024 0.2     Bilirubin, UA 12/29/2024 Negative     Occult Blood, UA 12/29/2024 Negative      Lab Results   Component Value Date    TRIG 143 01/29/2025    TRIG 131 09/18/2024    HDL 36 (L) 01/29/2025    HDL 46 09/18/2024     Imaging: XR chest 1 view portable  Result Date: 1/29/2025  Narrative: XR CHEST PORTABLE INDICATION: Chest Tightness. COMPARISON:  radiograph from CTA chest 9/17/2024 FINDINGS: Monitoring leads and clips project over the chest. Clear lungs. No pneumothorax or pleural effusion. Normal cardiomediastinal silhouette. Bones are unremarkable for age. Normal upper abdomen.     Impression: No acute cardiopulmonary disease. Resident: Sage Zaidi I, the attending radiologist, have reviewed the images and agree with the final report above. Workstation performed: [id number]     CTA head and neck with and without contrast  Result Date: 1/27/2025  Narrative: CTA NECK AND BRAIN WITH AND WITHOUT CONTRAST INDICATION: Concern for possible  vertebral artery abnormality on Noncon CT head COMPARISON:   Head CT from earlier the same day TECHNIQUE:  Post contrast imaging was performed after administration of iodinated contrast through the neck and brain. Post contrast axial 0.625 mm images timed to opacify the arterial system.  3D rendering was performed on an independent workstation.   MIP reconstructions performed. Coronal and sagittal reconstructions were performed of the non contrast portion of the brain. Radiation dose length product (DLP) for this visit:  626 mGy-cm .  This examination, like all CT scans performed in the [facility], was performed utilizing techniques to minimize radiation dose exposure, including the use of iterative reconstruction and automated exposure control. IV Contrast:  85 mL of iohexol (OMNIPAQUE) IMAGE QUALITY:   Diagnostic FINDINGS: NONCONTRAST BRAIN PARENCHYMA:No intracranial mass, mass effect or midline shift. No CT signs of acute infarction.  No acute parenchymal hemorrhage. VENTRICLES AND EXTRA-AXIAL SPACES:Normal for the patient's age. VISUALIZED ORBITS: Normal. PARANASAL SINUSES: Normal. CTA NECK ARCH AND GREAT VESSELS: Visualized arch and great vessels are normal. VERTEBRAL ARTERIES: Patent extracranial segments. RIGHT CAROTID: No stenosis.    No dissection. LEFT CAROTID: No stenosis.    No dissection. NASCET criteria was used to determine the degree of internal carotid artery diameter stenosis. CTA BRAIN: INTERNAL CAROTID ARTERIES: No stenosis or occlusion. ANTERIOR CEREBRAL ARTERY CIRCULATION:  No stenosis or occlusion. MIDDLE CEREBRAL ARTERY CIRCULATION:  No stenosis or occlusion. DISTAL VERTEBRAL ARTERIES:  No stenosis or occlusion. 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 Robust caliber of the dominant left vertebral artery. BASILAR ARTERY:  No stenosis or occlusion. POSTERIOR CEREBRAL ARTERIES: No stenosis or occlusion. VENOUS STRUCTURES:   Normal. NON VASCULAR ANATOMY BONY STRUCTURES:  No acute osseous abnormality. SOFT TISSUES OF THE NECK:  Normal. THORACIC INLET:  Unremarkable. OTHER: Large mucous retention cyst within the right maxillary sinus. Small mucous retention cyst within the left maxillary sinus. Mild mucosal thickening of the ethmoidal air cells and sphenoid sinuses.     Impression: Normal CTA of the head and neck. 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 The study was marked in EPIC for immediate notification. Workstation performed: [id number]     CT head without contrast  Result Date: 1/27/2025  Narrative: CT BRAIN - WITHOUT CONTRAST INDICATION:   Posterior headache, left arm and leg numbness. COMPARISON:  None. TECHNIQUE:  CT examination of the brain was performed.  Multiplanar 2D reformatted images were created from the source data. Radiation dose length product (DLP) for this visit:  851 mGy-cm .  This examination, like all CT scans performed in the [facility], was performed utilizing techniques to minimize radiation dose exposure, including the use of iterative reconstruction and automated exposure control. IMAGE QUALITY:  Diagnostic. FINDINGS: PARENCHYMA:No intracranial mass, mass effect or midline shift. No CT signs of acute infarction.  No acute parenchymal hemorrhage. VENTRICLES AND EXTRA-AXIAL SPACES: Large, hyperdense left vertebral artery compared to the right. Otherwise, normal for the patient's age. VISUALIZED ORBITS: Normal visualized orbits. PARANASAL SINUSES: Normal visualized paranasal sinuses. CALVARIUM AND EXTRACRANIAL SOFT TISSUES: Normal.     Impression: Large, hyperdense left vertebral artery compared to the right. Although this may be related to artifact and/or a hypoplastic right vertebral artery, CTA is recommended  for further evaluation. I personally discussed this study with CELE FERREIRA on 1/27/2025 1:39 PM. Workstation performed: [id number]     US scrotum and testicles  Result Date: 1/24/2025  Narrative: SCROTAL ULTRASOUND INDICATION: N50.812: Left testicular pain. COMPARISON: None TECHNIQUE: Ultrasound the scrotal contents was performed with a high frequency linear transducer utilizing volumetric sweep imaging as well as standard still image techniques. Imaging performed in longitudinal and transverse orientation. Color and spectral Doppler evaluation also performed bilaterally. FINDINGS: TESTES: Testes are symmetric and normal in size. RIGHT testis = 5.2 x 2.4 x 3.0 cm. Volume 19.4 mL Normal contour with homogeneous smooth echotexture. No intratesticular mass lesion or calcifications. LEFT testis = 4.9 x 2.5 x 3.2 cm. Volume 21.1 mL Normal contour with homogeneous smooth echotexture. No intratesticular mass lesion or calcifications. Doppler flow within both testes is present and symmetric. EPIDIDYMIDES: Normal size. Doppler ultrasound demonstrates normal blood flow. No epididymal lesions. HYDROCELE: No significant fluid present. VARICOCELE: None present. SCROTUM: Scrotal thickness and appearance within normal limits. No evidence for extratesticular mass or hernia demonstrated.     Impression: Normal exam. Workstation performed: [id number]       Review of Systems:  Review of Systems   Psychiatric/Behavioral:  The patient is nervous/anxious.    All other systems reviewed and are negative.      Physical Exam:  Vitals and nursing note reviewed.   Constitutional:       General: not in acute distress.     Appearance: well-developed.   HENT:      Head: Normocephalic and atraumatic.   Neck:     Supple, no JVD, no carotid bruit.  Cardiovascular:      Rate and Rhythm: Normal rate. Rhythm regular.     Heart sounds: No murmur. Normal S1S2, No gallops. No rubs.      No pedal edema.   Pulmonary:      Effort: Pulmonary effort is  normal. No respiratory distress.      Breath sounds: Normal breath sounds.   Abdominal:      Palpations: Abdomen is soft, no distention.     Tenderness: There is no abdominal tenderness.   Musculoskeletal:         General: No swelling.      Cervical back: Neck supple.   Skin:     General: Skin is warm and dry.    Neurological:      Mental Status: awake, alert, oriented x 3.   Psychiatric:         Mood and Affect: Mood normal.

## 2025-02-08 ENCOUNTER — APPOINTMENT (OUTPATIENT)
Dept: LAB | Facility: CLINIC | Age: 35
End: 2025-02-08
Payer: COMMERCIAL

## 2025-02-08 DIAGNOSIS — E78.2 HYPERLIPEMIA, MIXED: ICD-10-CM

## 2025-02-08 DIAGNOSIS — Z82.49 FAMILY HISTORY OF PREMATURE CAD: ICD-10-CM

## 2025-02-08 LAB — 25(OH)D3 SERPL-MCNC: 18.7 NG/ML (ref 30–100)

## 2025-02-08 PROCEDURE — 83695 ASSAY OF LIPOPROTEIN(A): CPT | Performed by: INTERNAL MEDICINE

## 2025-02-08 PROCEDURE — 82172 ASSAY OF APOLIPOPROTEIN: CPT | Performed by: INTERNAL MEDICINE

## 2025-02-08 PROCEDURE — 36415 COLL VENOUS BLD VENIPUNCTURE: CPT

## 2025-02-08 PROCEDURE — 82306 VITAMIN D 25 HYDROXY: CPT

## 2025-02-10 ENCOUNTER — RESULTS FOLLOW-UP (OUTPATIENT)
Dept: CARDIOLOGY CLINIC | Facility: CLINIC | Age: 35
End: 2025-02-10

## 2025-02-10 ENCOUNTER — TELEPHONE (OUTPATIENT)
Age: 35
End: 2025-02-10

## 2025-02-10 DIAGNOSIS — E78.2 MIXED DYSLIPIDEMIA: Primary | ICD-10-CM

## 2025-02-10 DIAGNOSIS — Z82.49 FAMILY HISTORY OF PREMATURE CAD: ICD-10-CM

## 2025-02-10 DIAGNOSIS — E55.9 VITAMIN D DEFICIENCY: Primary | ICD-10-CM

## 2025-02-10 LAB
APO B SERPL-MCNC: 126 MG/DL
LPA SERPL-SCNC: 40.2 NMOL/L

## 2025-02-10 RX ORDER — ERGOCALCIFEROL 1.25 MG/1
50000 CAPSULE ORAL WEEKLY
Qty: 12 CAPSULE | Refills: 0 | Status: SHIPPED | OUTPATIENT
Start: 2025-02-10

## 2025-02-10 RX ORDER — ROSUVASTATIN CALCIUM 20 MG/1
20 TABLET, COATED ORAL
Qty: 30 TABLET | Refills: 3 | Status: SHIPPED | OUTPATIENT
Start: 2025-02-10

## 2025-02-10 NOTE — TELEPHONE ENCOUNTER
Patient calling to request results of his labwork. He received a call regarding his Vitamin D levels, but has questions regarding the other labwork that came through his MyChart as abnormal.

## 2025-02-10 NOTE — TELEPHONE ENCOUNTER
Aware, thanks! Looks like that was ordered by cardiology so they can advise further but no I don't think this is something he needs to go to the ER for either way.

## 2025-02-10 NOTE — TELEPHONE ENCOUNTER
Please make patient aware he does not need to go to the ER for low vitamin D -- sent mychart message as well but I want to make sure there is no confusion. Thanks!

## 2025-02-10 NOTE — TELEPHONE ENCOUNTER
Spoke with pt regarding his Vitamin D levels ,pt stated that he saw that his Apolipoprotein B is Abnormal.     Please advise on pt blood work

## 2025-02-10 NOTE — TELEPHONE ENCOUNTER
Results of Apo B and Lpa and prior lipid panel were discussed. Given family hx of premature CAD, shared decision with patient was made to start Rosuvastatin 20 mg po once daily along with therapeutic lifestyle through heart healthy DASH diet and regular aerobic exercise. The indications, benefits, monitoring, adverse effects of newly prescribed medication, Rosuvastatin, were discussed with patient.

## 2025-02-14 ENCOUNTER — RESULTS FOLLOW-UP (OUTPATIENT)
Dept: FAMILY MEDICINE CLINIC | Facility: CLINIC | Age: 35
End: 2025-02-14

## 2025-02-14 ENCOUNTER — OFFICE VISIT (OUTPATIENT)
Dept: FAMILY MEDICINE CLINIC | Facility: CLINIC | Age: 35
End: 2025-02-14
Payer: COMMERCIAL

## 2025-02-14 ENCOUNTER — CONSULT (OUTPATIENT)
Dept: NEUROSURGERY | Facility: CLINIC | Age: 35
End: 2025-02-14
Payer: COMMERCIAL

## 2025-02-14 ENCOUNTER — APPOINTMENT (OUTPATIENT)
Dept: RADIOLOGY | Facility: CLINIC | Age: 35
End: 2025-02-14
Payer: COMMERCIAL

## 2025-02-14 VITALS
HEART RATE: 95 BPM | HEIGHT: 74 IN | WEIGHT: 205 LBS | RESPIRATION RATE: 16 BRPM | SYSTOLIC BLOOD PRESSURE: 100 MMHG | DIASTOLIC BLOOD PRESSURE: 72 MMHG | OXYGEN SATURATION: 96 % | BODY MASS INDEX: 26.31 KG/M2 | TEMPERATURE: 97.9 F

## 2025-02-14 VITALS
DIASTOLIC BLOOD PRESSURE: 84 MMHG | OXYGEN SATURATION: 98 % | HEIGHT: 74 IN | SYSTOLIC BLOOD PRESSURE: 124 MMHG | WEIGHT: 205 LBS | TEMPERATURE: 97.3 F | HEART RATE: 103 BPM | BODY MASS INDEX: 26.31 KG/M2

## 2025-02-14 DIAGNOSIS — F41.9 ANXIETY: ICD-10-CM

## 2025-02-14 DIAGNOSIS — M54.2 CERVICAL PAIN (NECK): Primary | ICD-10-CM

## 2025-02-14 DIAGNOSIS — R93.89 ABNORMAL COMPUTED TOMOGRAPHY ANGIOGRAPHY (CTA): ICD-10-CM

## 2025-02-14 DIAGNOSIS — R93.89 ABNORMAL COMPUTED TOMOGRAPHY ANGIOGRAPHY (CTA): Primary | ICD-10-CM

## 2025-02-14 DIAGNOSIS — M54.2 NECK PAIN: ICD-10-CM

## 2025-02-14 DIAGNOSIS — R00.2 PALPITATIONS: ICD-10-CM

## 2025-02-14 DIAGNOSIS — Z00.00 ROUTINE PHYSICAL EXAMINATION: ICD-10-CM

## 2025-02-14 DIAGNOSIS — M54.2 CERVICAL PAIN (NECK): ICD-10-CM

## 2025-02-14 PROCEDURE — 99214 OFFICE O/P EST MOD 30 MIN: CPT

## 2025-02-14 PROCEDURE — 72050 X-RAY EXAM NECK SPINE 4/5VWS: CPT

## 2025-02-14 PROCEDURE — 99395 PREV VISIT EST AGE 18-39: CPT

## 2025-02-14 PROCEDURE — 99243 OFF/OP CNSLTJ NEW/EST LOW 30: CPT | Performed by: PHYSICIAN ASSISTANT

## 2025-02-14 RX ORDER — METHOCARBAMOL 500 MG/1
500 TABLET, FILM COATED ORAL 2 TIMES DAILY PRN
Qty: 20 TABLET | Refills: 0 | Status: SHIPPED | OUTPATIENT
Start: 2025-02-14

## 2025-02-14 NOTE — ASSESSMENT & PLAN NOTE
Saw cardiology since our last visit   Continue on Toprol 12.5 mg QD, improvement in palpitations with medication

## 2025-02-14 NOTE — PROGRESS NOTES
"Name: Tai Chance      : 1990      MRN: [id number]  Encounter Provider: Deidra Torrez PA-C  Encounter Date: 2025   Encounter department: [facility] [place]  :  Assessment & Plan  Hypoplastic R vertebral artery  Pt presents to the  nsgy office today as a new pt for evaluation in regard to a CTA head/neck.   Noted incidentally on CTA completed for workup of posterior HA and L sided N/T  since resolved   Pt asx in this regard and neuro intact on exam     Imagin2025 CTA head/neck: Normal CTA of the head and neck. 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    Plan:   Pt encouraged to continue to closely monitor his neurological exam and sx   Imaging reviewed at length with the pt   CTA reveals dominant L vertebral artery and hypoplastic R vertebral artery. No significant stenosis or occlusion noted in the L artery.   Represents normal anatomical variant   No indication for acute neurosurgical intervention   No indication for further follow-up in this regard   Pt agreeable to the above noted plan   All questions answered at this time   Pt encouraged to call the office with any further questions or concerns or should they experience any worsening sx    Orders:    Ambulatory Referral to Neurosurgery    Neck pain  Reports some posterior neck pain/tension   - been present for several years   - pt attributes this pain to hi posture when at work   - denies any radicular pain, weakness, N/T, balance trouble, falls, BBI, urinary retention, etc.   - does admit to an intermittent \"cold like\" sensation in his L arm that occurs only when sleeping, and quickly resolves when he wakes up and moves around   - intact on exam     Imagin2024 upright cervical x-rays: Straightening normal " "cervical lordosis may be related to patient positioning or muscle spasm. without acute osseous pathology or significant degenerative change.     Plan:   Encouraged to monitor neuro exam and sx   Imaging reviewed with the pt   Noted straightening of normal cervical lordosis which may be related to muscle spasm or some mild degenerative changes.  No acute fracture or malalignment.  Recommend conservative management at this time  Referral placed to PT   Pt may follow-up as needed in this regard moving forward     Orders:    Ambulatory Referral to Physical Therapy; Future      History of Present Illness   Patient is a 34-year-old male with a PMH significant for anxiety who presents to the [facility] neurosurgery office today as a new patient for evaluation in regards to an incidentally noted hypoplastic right vertebral artery on the CTA head/neck completed in January 2025.  Patient states he was seen in the ER in January for posterior neck/head pain, dizziness, and some left-sided numbness.  He underwent a CTA for further evaluation which did not reveal any acute intracranial abnormality or ischemia.  Patient was noted to have a smaller/hypoplastic right vertebral artery in comparison to the left side.  Patient was referred to [facility] neurosurgery for outpatient follow-up in this regard.    Today, the patient offers no complaints.  He states he has been doing well.  He admits to a mild posterior neck pain/tension which he feels is related to his poor posture, especially when sitting in the car at work.  Patient states his pain is about a 2-3/10.  Patient also reports some cold sensation and tingling at night down his left arm.  This only occurs when he is sleeping and quickly resolves once he wakes up.  He was told he has a \"pinched nerve\" in his shoulder.  Patient has not done any physical therapy nor has he ever seen pain management.  He offers no other complaints.  He denies any headaches, dizziness, vision " changes, balance difficulty, weakness, numbness, bowel/bladder incontinence, urinary retention, falls, etc.      Review of Systems   HENT:  Negative for ear pain, tinnitus and trouble swallowing.    Eyes:  Positive for photophobia (at times) and pain (left eye pressure). Negative for visual disturbance.   Musculoskeletal:  Positive for neck pain (occipital pain) and neck stiffness. Negative for gait problem.        Reports left shoulder has pinched nerve related to cold sensation at times   Neurological:  Negative for dizziness, light-headedness and headaches.    I have personally reviewed the MA's review of systems and made changes as necessary.    Medical History Reviewed by provider this encounter:     .  Past Medical History   Past Medical History:   Diagnosis Date    Murmur, cardiac      Past Surgical History:   Procedure Laterality Date    COLONOSCOPY       Family History   Problem Relation Age of Onset    Thyroid cancer Mother     Other (AIDS) Mother     Heart attack Father 50 - 59    Diabetes Maternal Grandmother       reports that he has never smoked. He has never been exposed to tobacco smoke. He has never used smokeless tobacco. He reports current alcohol use. He reports current drug use. Frequency: 7.00 times per week. Drug: Marijuana.  Current Outpatient Medications on File Prior to Visit   Medication Sig Dispense Refill    busPIRone (BUSPAR) 15 mg tablet Take 1 tablet (15 mg total) by mouth every 12 (twelve) hours 60 tablet 0    methocarbamol (ROBAXIN) 500 mg tablet Take 1 tablet (500 mg total) by mouth 2 (two) times a day as needed for muscle spasms 20 tablet 0    metoprolol succinate (TOPROL-XL) 25 mg 24 hr tablet TAKE 1/2 TABLET BY MOUTH EVERY DAY 45 tablet 1    rosuvastatin (CRESTOR) 20 MG tablet Take 1 tablet (20 mg total) by mouth daily at bedtime 30 tablet 3    Vitamin D, Ergocalciferol, 28833 units CAPS Take 50,000 Units by mouth once a week 12 capsule 0    [DISCONTINUED] benzonatate (TESSALON  PERLES) 100 mg capsule Take 1 capsule (100 mg total) by mouth 3 (three) times a day as needed for cough (Patient not taking: Reported on 2/7/2025) 20 capsule 0    [DISCONTINUED] fluticasone (FLONASE) 50 mcg/act nasal spray 1 spray into each nostril daily (Patient not taking: Reported on 2/7/2025) 16 g 0    [DISCONTINUED] ondansetron (ZOFRAN-ODT) 4 mg disintegrating tablet Take 1 tablet (4 mg total) by mouth every 6 (six) hours as needed for nausea or vomiting (Patient not taking: Reported on 2/7/2025) 20 tablet 0     No current facility-administered medications on file prior to visit.   No Known Allergies   Current Outpatient Medications on File Prior to Visit   Medication Sig Dispense Refill    busPIRone (BUSPAR) 15 mg tablet Take 1 tablet (15 mg total) by mouth every 12 (twelve) hours 60 tablet 0    methocarbamol (ROBAXIN) 500 mg tablet Take 1 tablet (500 mg total) by mouth 2 (two) times a day as needed for muscle spasms 20 tablet 0    metoprolol succinate (TOPROL-XL) 25 mg 24 hr tablet TAKE 1/2 TABLET BY MOUTH EVERY DAY 45 tablet 1    rosuvastatin (CRESTOR) 20 MG tablet Take 1 tablet (20 mg total) by mouth daily at bedtime 30 tablet 3    Vitamin D, Ergocalciferol, 98613 units CAPS Take 50,000 Units by mouth once a week 12 capsule 0    [DISCONTINUED] benzonatate (TESSALON PERLES) 100 mg capsule Take 1 capsule (100 mg total) by mouth 3 (three) times a day as needed for cough (Patient not taking: Reported on 2/7/2025) 20 capsule 0    [DISCONTINUED] fluticasone (FLONASE) 50 mcg/act nasal spray 1 spray into each nostril daily (Patient not taking: Reported on 2/7/2025) 16 g 0    [DISCONTINUED] ondansetron (ZOFRAN-ODT) 4 mg disintegrating tablet Take 1 tablet (4 mg total) by mouth every 6 (six) hours as needed for nausea or vomiting (Patient not taking: Reported on 2/7/2025) 20 tablet 0     No current facility-administered medications on file prior to visit.        Objective   /72 (BP Location: Left arm, Patient  "Position: Sitting, Cuff Size: Standard)   Pulse 95   Temp 97.9 °F (36.6 °C) (Temporal)   Resp 16   Ht 6' 2\" (1.88 m)   Wt 93 kg (205 lb)   SpO2 96%   BMI 26.32 kg/m²     Physical Exam  Neurological Exam  General appearance: alert, appears stated age, cooperative and no distress  Head: Normocephalic, without obvious abnormality, atraumatic  Eyes: EOMI, PERRL  Neck: supple, symmetrical, trachea midline and NT  Back: no kyphosis present, no tenderness to percussion or palpation  Lungs: non labored breathing, no resp distress on room air   Heart: regular heart rate  Neurologic:   Mental status: Alert, oriented x3, thought content appropriate  Cranial nerves: grossly intact (Cranial nerves II-XII)  Sensory: normal to LT marcus throughout   Motor: moving all extremities without focal weakness   - strength 5/5 throughout marcus UE and marcus LE   Coordination: finger to nose normal bilaterally, no drift bilaterally    Radiology Results Review: I personally reviewed the following image studies in PACS and associated radiology reports: CT head and xray(s). My interpretation of the radiology images/reports is: see above.    Administrative Statements   I have spent a total time of 30 minutes in caring for this patient on the day of the visit/encounter including Diagnostic results, Prognosis, Risks and benefits of tx options, Instructions for management, Patient and family education, Importance of tx compliance, Risk factor reductions, Impressions, Counseling / Coordination of care, Documenting in the medical record, Reviewing / ordering tests, medicine, procedures  , and Obtaining or reviewing history  .   "

## 2025-02-14 NOTE — ASSESSMENT & PLAN NOTE
Significant improvement in anxiety since increasing Buspar to 15 mg BID -- therefore will continue current medication and dosage

## 2025-02-14 NOTE — PROGRESS NOTES
Adult Annual Physical  Name: Tai Chance      : 1990      MRN: [id number]  Encounter Provider: Rosey Mckeon PA-C  Encounter Date: 2025   Encounter department: [facility]    Assessment & Plan  Cervical pain (neck)  Reports left sided neck pain with radiation down left shoulder/upper arm -- suspect muscle inflammation with possible nerve impingement   Will check XR of cervical spine for further evaluation and prescribed robaxin as needed for muscle spasm/pain -- discussed side effect of drowsiness/sedation   Recommend supportive care with warm heating pad to area  Will follow up with XR results    Orders:    XR spine cervical complete 4 or 5 vw non injury; Future    methocarbamol (ROBAXIN) 500 mg tablet; Take 1 tablet (500 mg total) by mouth 2 (two) times a day as needed for muscle spasms    Anxiety  Significant improvement in anxiety since increasing Buspar to 15 mg BID -- therefore will continue current medication and dosage        Palpitations  Saw cardiology since our last visit   Continue on Toprol 12.5 mg QD, improvement in palpitations with medication       Hypoplastic R vertebral artery  Has appointment with neurosurgery later this afternoon for further evaluation of recent finding        Routine physical examination  Physical exam unremarkable; BP WNL  Up to date on routine labs   Follow up yearly for annual physical, sooner as needed       Immunizations and preventive care screenings were discussed with patient today. Appropriate education was printed on patient's after visit summary.    Counseling:  Dental Health: discussed importance of regular tooth brushing, flossing, and dental visits.  Exercise: the importance of regular exercise/physical activity was discussed. Recommend exercise 3-5 times per week for at least 30 minutes.          History of Present Illness       Patient presents for routine physical.   Reports left sided neck pain with radiation into left arm  -- some numbness but no tingling. No weakness or pain. Denies chest pain/sob.     Patient is moving to [place] so will not be coming back after today's visit.     Adult Annual Physical:  Patient presents for annual physical.     Diet and Physical Activity:  - Diet/Nutrition: well balanced diet and consuming 3-5 servings of fruits/vegetables daily.  - Exercise: moderate cardiovascular exercise and 5-7 times a week on average.    General Health:  - Sleep: sleeps well.  - Hearing: normal hearing bilateral ears.  - Vision: no vision problems.  - Dental: no dental visits for > 1 year and brushes teeth twice daily.    Review of Systems   Constitutional:  Negative for chills, diaphoresis and fever.   Respiratory:  Negative for cough, chest tightness, shortness of breath and wheezing.    Cardiovascular:  Positive for palpitations. Negative for chest pain.   Gastrointestinal:  Negative for abdominal pain, blood in stool, constipation, diarrhea, nausea and vomiting.   Neurological:  Negative for dizziness, light-headedness and headaches.     Medical History Reviewed by provider this encounter:  Tobacco  Allergies  Meds  Problems  Med Hx  Surg Hx  Fam Hx     .  Past Medical History   Past Medical History:   Diagnosis Date    Murmur, cardiac      Past Surgical History:   Procedure Laterality Date    COLONOSCOPY       Family History   Problem Relation Age of Onset    Thyroid cancer Mother     Other (AIDS) Mother     Heart attack Father 50 - 59    Diabetes Maternal Grandmother       reports that he has never smoked. He has never been exposed to tobacco smoke. He has never used smokeless tobacco. He reports current alcohol use. He reports current drug use. Frequency: 7.00 times per week. Drug: Marijuana.  Current Outpatient Medications on File Prior to Visit   Medication Sig Dispense Refill    busPIRone (BUSPAR) 15 mg tablet Take 1 tablet (15 mg total) by mouth every 12 (twelve) hours 60 tablet 0    metoprolol succinate  (TOPROL-XL) 25 mg 24 hr tablet TAKE 1/2 TABLET BY MOUTH EVERY DAY 45 tablet 1    rosuvastatin (CRESTOR) 20 MG tablet Take 1 tablet (20 mg total) by mouth daily at bedtime 30 tablet 3    Vitamin D, Ergocalciferol, 85784 units CAPS Take 50,000 Units by mouth once a week 12 capsule 0    [DISCONTINUED] benzonatate (TESSALON PERLES) 100 mg capsule Take 1 capsule (100 mg total) by mouth 3 (three) times a day as needed for cough (Patient not taking: Reported on 2/7/2025) 20 capsule 0    [DISCONTINUED] fluticasone (FLONASE) 50 mcg/act nasal spray 1 spray into each nostril daily (Patient not taking: Reported on 2/7/2025) 16 g 0    [DISCONTINUED] ondansetron (ZOFRAN-ODT) 4 mg disintegrating tablet Take 1 tablet (4 mg total) by mouth every 6 (six) hours as needed for nausea or vomiting (Patient not taking: Reported on 2/7/2025) 20 tablet 0     No current facility-administered medications on file prior to visit.   No Known Allergies   Current Outpatient Medications on File Prior to Visit   Medication Sig Dispense Refill    busPIRone (BUSPAR) 15 mg tablet Take 1 tablet (15 mg total) by mouth every 12 (twelve) hours 60 tablet 0    metoprolol succinate (TOPROL-XL) 25 mg 24 hr tablet TAKE 1/2 TABLET BY MOUTH EVERY DAY 45 tablet 1    rosuvastatin (CRESTOR) 20 MG tablet Take 1 tablet (20 mg total) by mouth daily at bedtime 30 tablet 3    Vitamin D, Ergocalciferol, 63227 units CAPS Take 50,000 Units by mouth once a week 12 capsule 0    [DISCONTINUED] benzonatate (TESSALON PERLES) 100 mg capsule Take 1 capsule (100 mg total) by mouth 3 (three) times a day as needed for cough (Patient not taking: Reported on 2/7/2025) 20 capsule 0    [DISCONTINUED] fluticasone (FLONASE) 50 mcg/act nasal spray 1 spray into each nostril daily (Patient not taking: Reported on 2/7/2025) 16 g 0    [DISCONTINUED] ondansetron (ZOFRAN-ODT) 4 mg disintegrating tablet Take 1 tablet (4 mg total) by mouth every 6 (six) hours as needed for nausea or vomiting  "(Patient not taking: Reported on 2/7/2025) 20 tablet 0     No current facility-administered medications on file prior to visit.      Social History     Tobacco Use    Smoking status: Never     Passive exposure: Never    Smokeless tobacco: Never   Vaping Use    Vaping status: Never Used   Substance and Sexual Activity    Alcohol use: Yes     Comment: occasionally    Drug use: Yes     Frequency: 7.0 times per week     Types: Marijuana     Comment: once a day    Sexual activity: Not on file       Objective   /84 (BP Location: Left arm, Patient Position: Sitting, Cuff Size: Large)   Pulse 103   Temp (!) 97.3 °F (36.3 °C)   Ht 6' 2\" (1.88 m)   Wt 93 kg (205 lb)   SpO2 98%   BMI 26.32 kg/m²     Physical Exam  Vitals reviewed.   Constitutional:       General: He is not in acute distress.     Appearance: Normal appearance. He is not ill-appearing or diaphoretic.   HENT:      Head: Normocephalic and atraumatic.      Right Ear: Tympanic membrane, ear canal and external ear normal. There is no impacted cerumen.      Left Ear: Tympanic membrane, ear canal and external ear normal. There is no impacted cerumen.      Nose: Nose normal. No congestion or rhinorrhea.      Mouth/Throat:      Mouth: Mucous membranes are moist.      Pharynx: Oropharynx is clear. No oropharyngeal exudate or posterior oropharyngeal erythema.   Eyes:      General:         Right eye: No discharge.         Left eye: No discharge.      Conjunctiva/sclera: Conjunctivae normal.   Cardiovascular:      Rate and Rhythm: Normal rate and regular rhythm.      Pulses: Normal pulses.      Heart sounds: Normal heart sounds. No murmur heard.  Pulmonary:      Effort: Pulmonary effort is normal. No respiratory distress.      Breath sounds: Normal breath sounds. No wheezing, rhonchi or rales.   Abdominal:      General: Bowel sounds are normal. There is no distension.      Palpations: Abdomen is soft. There is no mass.      Tenderness: There is no abdominal " tenderness. There is no guarding or rebound.      Hernia: No hernia is present.   Musculoskeletal:         General: Normal range of motion.      Cervical back: Normal range of motion and neck supple. No rigidity or tenderness.      Right lower leg: No edema.      Left lower leg: No edema.   Lymphadenopathy:      Cervical: No cervical adenopathy.   Skin:     General: Skin is warm.   Neurological:      General: No focal deficit present.      Mental Status: He is alert.      Gait: Gait normal.   Psychiatric:         Mood and Affect: Mood normal.

## 2025-02-14 NOTE — ASSESSMENT & PLAN NOTE
Pt presents to the  nsgy office today as a new pt for evaluation in regard to a CTA head/neck.   Noted incidentally on CTA completed for workup of posterior HA and L sided N/T  since resolved   Pt asx in this regard and neuro intact on exam     Imagin2025 CTA head/neck: Normal CTA of the head and neck. 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    Plan:   Pt encouraged to continue to closely monitor his neurological exam and sx   Imaging reviewed at length with the pt   CTA reveals dominant L vertebral artery and hypoplastic R vertebral artery. No significant stenosis or occlusion noted in the L artery.   Represents normal anatomical variant   No indication for acute neurosurgical intervention   No indication for further follow-up in this regard   Pt agreeable to the above noted plan   All questions answered at this time   Pt encouraged to call the office with any further questions or concerns or should they experience any worsening sx    Orders:    Ambulatory Referral to Neurosurgery

## 2025-02-14 NOTE — ASSESSMENT & PLAN NOTE
Has appointment with neurosurgery later this afternoon for further evaluation of recent finding

## 2025-02-20 DIAGNOSIS — F41.9 ANXIETY: ICD-10-CM

## 2025-02-20 RX ORDER — BUSPIRONE HYDROCHLORIDE 15 MG/1
15 TABLET ORAL EVERY 12 HOURS
Qty: 180 TABLET | Refills: 1 | Status: SHIPPED | OUTPATIENT
Start: 2025-02-20

## 2025-04-11 DIAGNOSIS — E78.2 MIXED DYSLIPIDEMIA: ICD-10-CM

## 2025-04-11 DIAGNOSIS — Z82.49 FAMILY HISTORY OF PREMATURE CAD: ICD-10-CM

## 2025-04-11 RX ORDER — ROSUVASTATIN CALCIUM 20 MG/1
20 TABLET, COATED ORAL
Qty: 30 TABLET | Refills: 3 | Status: SHIPPED | OUTPATIENT
Start: 2025-04-11

## 2025-04-29 DIAGNOSIS — R00.0 TACHYCARDIA: ICD-10-CM

## 2025-04-29 DIAGNOSIS — E78.2 MIXED DYSLIPIDEMIA: ICD-10-CM

## 2025-04-29 DIAGNOSIS — Z82.49 FAMILY HISTORY OF PREMATURE CAD: ICD-10-CM

## 2025-04-29 DIAGNOSIS — F41.9 ANXIETY: ICD-10-CM

## 2025-04-29 DIAGNOSIS — R00.2 PALPITATIONS: ICD-10-CM

## 2025-04-30 DIAGNOSIS — F41.9 ANXIETY: Primary | ICD-10-CM

## 2025-04-30 RX ORDER — METOPROLOL SUCCINATE 25 MG/1
12.5 TABLET, EXTENDED RELEASE ORAL DAILY
Qty: 45 TABLET | Refills: 1 | Status: SHIPPED | OUTPATIENT
Start: 2025-04-30

## 2025-04-30 RX ORDER — BUSPIRONE HYDROCHLORIDE 10 MG/1
10 TABLET ORAL 2 TIMES DAILY
Qty: 60 TABLET | Refills: 0 | Status: SHIPPED | OUTPATIENT
Start: 2025-04-30

## 2025-04-30 RX ORDER — ROSUVASTATIN CALCIUM 20 MG/1
20 TABLET, COATED ORAL
Qty: 90 TABLET | Refills: 1 | Status: SHIPPED | OUTPATIENT
Start: 2025-04-30

## 2025-04-30 RX ORDER — BUSPIRONE HYDROCHLORIDE 15 MG/1
15 TABLET ORAL EVERY 12 HOURS
Qty: 180 TABLET | Refills: 0 | OUTPATIENT
Start: 2025-04-30